# Patient Record
Sex: MALE | Race: BLACK OR AFRICAN AMERICAN | Employment: UNEMPLOYED | URBAN - METROPOLITAN AREA
[De-identification: names, ages, dates, MRNs, and addresses within clinical notes are randomized per-mention and may not be internally consistent; named-entity substitution may affect disease eponyms.]

---

## 2018-10-20 ENCOUNTER — ANESTHESIA (OUTPATIENT)
Dept: OPERATING ROOM | Age: 63
DRG: 004 | End: 2018-10-20
Payer: MEDICARE

## 2018-10-20 ENCOUNTER — APPOINTMENT (OUTPATIENT)
Dept: GENERAL RADIOLOGY | Age: 63
DRG: 004 | End: 2018-10-20
Payer: MEDICARE

## 2018-10-20 ENCOUNTER — HOSPITAL ENCOUNTER (INPATIENT)
Age: 63
LOS: 5 days | Discharge: HOME HEALTH | DRG: 004 | End: 2018-10-25
Attending: EMERGENCY MEDICINE | Admitting: INTERNAL MEDICINE
Payer: MEDICARE

## 2018-10-20 ENCOUNTER — ANESTHESIA EVENT (OUTPATIENT)
Dept: OPERATING ROOM | Age: 63
DRG: 004 | End: 2018-10-20
Payer: MEDICARE

## 2018-10-20 VITALS — SYSTOLIC BLOOD PRESSURE: 109 MMHG | DIASTOLIC BLOOD PRESSURE: 68 MMHG | OXYGEN SATURATION: 100 %

## 2018-10-20 DIAGNOSIS — J96.01 ACUTE RESPIRATORY FAILURE WITH HYPOXIA (HCC): ICD-10-CM

## 2018-10-20 DIAGNOSIS — T78.3XXA ANGIOEDEMA, INITIAL ENCOUNTER: Primary | ICD-10-CM

## 2018-10-20 PROBLEM — T46.4X5A ACE INHIBITOR-AGGRAVATED ANGIOEDEMA, INITIAL ENCOUNTER: Status: ACTIVE | Noted: 2018-10-20

## 2018-10-20 LAB
ABSOLUTE EOS #: <0.03 K/UL (ref 0–0.44)
ABSOLUTE IMMATURE GRANULOCYTE: 0.12 K/UL (ref 0–0.3)
ABSOLUTE LYMPH #: 0.7 K/UL (ref 1.1–3.7)
ABSOLUTE MONO #: 0.43 K/UL (ref 0.1–1.2)
ALLEN TEST: ABNORMAL
ALLEN TEST: POSITIVE
ANION GAP SERPL CALCULATED.3IONS-SCNC: 14 MMOL/L (ref 9–17)
BASOPHILS # BLD: 0 % (ref 0–2)
BASOPHILS ABSOLUTE: 0.03 K/UL (ref 0–0.2)
BUN BLDV-MCNC: 23 MG/DL (ref 8–23)
BUN/CREAT BLD: ABNORMAL (ref 9–20)
CALCIUM SERPL-MCNC: 8.7 MG/DL (ref 8.6–10.4)
CHLORIDE BLD-SCNC: 102 MMOL/L (ref 98–107)
CO2: 20 MMOL/L (ref 20–31)
CREAT SERPL-MCNC: 1.7 MG/DL (ref 0.7–1.2)
DIFFERENTIAL TYPE: ABNORMAL
EOSINOPHILS RELATIVE PERCENT: 0 % (ref 1–4)
FIO2: 55
FIO2: ABNORMAL
GFR AFRICAN AMERICAN: 50 ML/MIN
GFR NON-AFRICAN AMERICAN: 41 ML/MIN
GFR SERPL CREATININE-BSD FRML MDRD: ABNORMAL ML/MIN/{1.73_M2}
GFR SERPL CREATININE-BSD FRML MDRD: ABNORMAL ML/MIN/{1.73_M2}
GLUCOSE BLD-MCNC: 155 MG/DL (ref 70–99)
HCO3 VENOUS: 24.2 MMOL/L (ref 22–29)
HCT VFR BLD CALC: 41.5 % (ref 40.7–50.3)
HEMOGLOBIN: 13.4 G/DL (ref 13–17)
IMMATURE GRANULOCYTES: 1 %
LYMPHOCYTES # BLD: 5 % (ref 24–43)
MCH RBC QN AUTO: 29.2 PG (ref 25.2–33.5)
MCHC RBC AUTO-ENTMCNC: 32.3 G/DL (ref 28.4–34.8)
MCV RBC AUTO: 90.4 FL (ref 82.6–102.9)
MODE: ABNORMAL
MODE: ABNORMAL
MONOCYTES # BLD: 3 % (ref 3–12)
NEGATIVE BASE EXCESS, ART: 1 (ref 0–2)
NEGATIVE BASE EXCESS, VEN: 7 (ref 0–2)
NRBC AUTOMATED: 0 PER 100 WBC
O2 DEVICE/FLOW/%: ABNORMAL
O2 DEVICE/FLOW/%: ABNORMAL
O2 SAT, VEN: 84 % (ref 60–85)
PATIENT TEMP: ABNORMAL
PATIENT TEMP: ABNORMAL
PCO2, VEN: 76.3 MM HG (ref 41–51)
PDW BLD-RTO: 14 % (ref 11.8–14.4)
PH VENOUS: 7.11 (ref 7.32–7.43)
PLATELET # BLD: 306 K/UL (ref 138–453)
PLATELET ESTIMATE: ABNORMAL
PMV BLD AUTO: 9.9 FL (ref 8.1–13.5)
PO2, VEN: 67 MM HG (ref 30–50)
POC HCO3: 24.5 MMOL/L (ref 21–28)
POC O2 SATURATION: 99 % (ref 94–98)
POC PCO2 TEMP: ABNORMAL MM HG
POC PCO2 TEMP: ABNORMAL MM HG
POC PCO2: 43.7 MM HG (ref 35–48)
POC PH TEMP: ABNORMAL
POC PH TEMP: ABNORMAL
POC PH: 7.36 (ref 7.35–7.45)
POC PO2 TEMP: ABNORMAL MM HG
POC PO2 TEMP: ABNORMAL MM HG
POC PO2: 137.5 MM HG (ref 83–108)
POSITIVE BASE EXCESS, ART: ABNORMAL (ref 0–3)
POSITIVE BASE EXCESS, VEN: ABNORMAL (ref 0–3)
POTASSIUM SERPL-SCNC: 5 MMOL/L (ref 3.7–5.3)
RBC # BLD: 4.59 M/UL (ref 4.21–5.77)
RBC # BLD: ABNORMAL 10*6/UL
SAMPLE SITE: ABNORMAL
SAMPLE SITE: ABNORMAL
SEG NEUTROPHILS: 91 % (ref 36–65)
SEGMENTED NEUTROPHILS ABSOLUTE COUNT: 13.06 K/UL (ref 1.5–8.1)
SODIUM BLD-SCNC: 136 MMOL/L (ref 135–144)
TCO2 (CALC), ART: 26 MMOL/L (ref 22–29)
TOTAL CO2, VENOUS: 27 MMOL/L (ref 23–30)
WBC # BLD: 14.4 K/UL (ref 3.5–11.3)
WBC # BLD: ABNORMAL 10*3/UL

## 2018-10-20 PROCEDURE — 2580000003 HC RX 258: Performed by: SURGERY

## 2018-10-20 PROCEDURE — 82803 BLOOD GASES ANY COMBINATION: CPT

## 2018-10-20 PROCEDURE — 6360000002 HC RX W HCPCS: Performed by: NURSE PRACTITIONER

## 2018-10-20 PROCEDURE — 2500000003 HC RX 250 WO HCPCS: Performed by: NURSE ANESTHETIST, CERTIFIED REGISTERED

## 2018-10-20 PROCEDURE — 5A1945Z RESPIRATORY VENTILATION, 24-96 CONSECUTIVE HOURS: ICD-10-PCS | Performed by: FAMILY MEDICINE

## 2018-10-20 PROCEDURE — 2500000003 HC RX 250 WO HCPCS

## 2018-10-20 PROCEDURE — 2000000000 HC ICU R&B

## 2018-10-20 PROCEDURE — 6360000002 HC RX W HCPCS: Performed by: STUDENT IN AN ORGANIZED HEALTH CARE EDUCATION/TRAINING PROGRAM

## 2018-10-20 PROCEDURE — 2500000003 HC RX 250 WO HCPCS: Performed by: NURSE PRACTITIONER

## 2018-10-20 PROCEDURE — 87086 URINE CULTURE/COLONY COUNT: CPT

## 2018-10-20 PROCEDURE — 96372 THER/PROPH/DIAG INJ SC/IM: CPT

## 2018-10-20 PROCEDURE — 6360000002 HC RX W HCPCS

## 2018-10-20 PROCEDURE — 71045 X-RAY EXAM CHEST 1 VIEW: CPT

## 2018-10-20 PROCEDURE — 36600 WITHDRAWAL OF ARTERIAL BLOOD: CPT

## 2018-10-20 PROCEDURE — 94762 N-INVAS EAR/PLS OXIMTRY CONT: CPT

## 2018-10-20 PROCEDURE — 2709999900 HC NON-CHARGEABLE SUPPLY: Performed by: SURGERY

## 2018-10-20 PROCEDURE — 2500000003 HC RX 250 WO HCPCS: Performed by: STUDENT IN AN ORGANIZED HEALTH CARE EDUCATION/TRAINING PROGRAM

## 2018-10-20 PROCEDURE — 99285 EMERGENCY DEPT VISIT HI MDM: CPT

## 2018-10-20 PROCEDURE — 0B110F4 BYPASS TRACHEA TO CUTANEOUS WITH TRACHEOSTOMY DEVICE, OPEN APPROACH: ICD-10-PCS | Performed by: SURGERY

## 2018-10-20 PROCEDURE — 94002 VENT MGMT INPAT INIT DAY: CPT

## 2018-10-20 PROCEDURE — 3700000001 HC ADD 15 MINUTES (ANESTHESIA): Performed by: SURGERY

## 2018-10-20 PROCEDURE — S0028 INJECTION, FAMOTIDINE, 20 MG: HCPCS

## 2018-10-20 PROCEDURE — 6370000000 HC RX 637 (ALT 250 FOR IP): Performed by: SURGERY

## 2018-10-20 PROCEDURE — 3700000000 HC ANESTHESIA ATTENDED CARE: Performed by: SURGERY

## 2018-10-20 PROCEDURE — 31600 PLANNED TRACHEOSTOMY: CPT | Performed by: SURGERY

## 2018-10-20 PROCEDURE — 6360000002 HC RX W HCPCS: Performed by: EMERGENCY MEDICINE

## 2018-10-20 PROCEDURE — 6360000002 HC RX W HCPCS: Performed by: NURSE ANESTHETIST, CERTIFIED REGISTERED

## 2018-10-20 PROCEDURE — 94770 HC ETCO2 MONITOR DAILY: CPT

## 2018-10-20 PROCEDURE — 2580000003 HC RX 258: Performed by: NURSE ANESTHETIST, CERTIFIED REGISTERED

## 2018-10-20 PROCEDURE — 6360000002 HC RX W HCPCS: Performed by: INTERNAL MEDICINE

## 2018-10-20 PROCEDURE — 96375 TX/PRO/DX INJ NEW DRUG ADDON: CPT

## 2018-10-20 PROCEDURE — 85025 COMPLETE CBC W/AUTO DIFF WBC: CPT

## 2018-10-20 PROCEDURE — 96374 THER/PROPH/DIAG INJ IV PUSH: CPT

## 2018-10-20 PROCEDURE — 80048 BASIC METABOLIC PNL TOTAL CA: CPT

## 2018-10-20 RX ORDER — PROPOFOL 10 MG/ML
INJECTION, EMULSION INTRAVENOUS
Status: DISCONTINUED
Start: 2018-10-20 | End: 2018-10-21

## 2018-10-20 RX ORDER — METOPROLOL TARTRATE 5 MG/5ML
5 INJECTION INTRAVENOUS EVERY 4 HOURS PRN
Status: DISCONTINUED | OUTPATIENT
Start: 2018-10-20 | End: 2018-10-25 | Stop reason: HOSPADM

## 2018-10-20 RX ORDER — DIPHENHYDRAMINE HYDROCHLORIDE 50 MG/ML
50 INJECTION INTRAMUSCULAR; INTRAVENOUS ONCE
Status: COMPLETED | OUTPATIENT
Start: 2018-10-20 | End: 2018-10-20

## 2018-10-20 RX ORDER — SODIUM CHLORIDE, SODIUM LACTATE, POTASSIUM CHLORIDE, CALCIUM CHLORIDE 600; 310; 30; 20 MG/100ML; MG/100ML; MG/100ML; MG/100ML
INJECTION, SOLUTION INTRAVENOUS CONTINUOUS PRN
Status: DISCONTINUED | OUTPATIENT
Start: 2018-10-20 | End: 2018-10-20 | Stop reason: SDUPTHER

## 2018-10-20 RX ORDER — MIDAZOLAM HYDROCHLORIDE 1 MG/ML
INJECTION INTRAMUSCULAR; INTRAVENOUS PRN
Status: DISCONTINUED | OUTPATIENT
Start: 2018-10-20 | End: 2018-10-20 | Stop reason: SDUPTHER

## 2018-10-20 RX ORDER — KETAMINE HYDROCHLORIDE 50 MG/ML
2 INJECTION, SOLUTION, CONCENTRATE INTRAMUSCULAR; INTRAVENOUS ONCE
Status: COMPLETED | OUTPATIENT
Start: 2018-10-20 | End: 2018-10-20

## 2018-10-20 RX ORDER — CEFAZOLIN SODIUM 1 G/3ML
INJECTION, POWDER, FOR SOLUTION INTRAMUSCULAR; INTRAVENOUS PRN
Status: DISCONTINUED | OUTPATIENT
Start: 2018-10-20 | End: 2018-10-20 | Stop reason: SDUPTHER

## 2018-10-20 RX ORDER — WOUND DRESSING ADHESIVE - LIQUID
LIQUID MISCELLANEOUS PRN
Status: DISCONTINUED | OUTPATIENT
Start: 2018-10-20 | End: 2018-10-20 | Stop reason: HOSPADM

## 2018-10-20 RX ORDER — SODIUM CHLORIDE, SODIUM LACTATE, POTASSIUM CHLORIDE, CALCIUM CHLORIDE 600; 310; 30; 20 MG/100ML; MG/100ML; MG/100ML; MG/100ML
INJECTION, SOLUTION INTRAVENOUS CONTINUOUS
Status: DISCONTINUED | OUTPATIENT
Start: 2018-10-20 | End: 2018-10-24

## 2018-10-20 RX ORDER — METHYLPREDNISOLONE SODIUM SUCCINATE 125 MG/2ML
125 INJECTION, POWDER, LYOPHILIZED, FOR SOLUTION INTRAMUSCULAR; INTRAVENOUS ONCE
Status: COMPLETED | OUTPATIENT
Start: 2018-10-20 | End: 2018-10-20

## 2018-10-20 RX ORDER — HYDRALAZINE HYDROCHLORIDE 20 MG/ML
10 INJECTION INTRAMUSCULAR; INTRAVENOUS EVERY 6 HOURS PRN
Status: DISCONTINUED | OUTPATIENT
Start: 2018-10-20 | End: 2018-10-21

## 2018-10-20 RX ORDER — FENTANYL CITRATE 50 UG/ML
INJECTION, SOLUTION INTRAMUSCULAR; INTRAVENOUS PRN
Status: DISCONTINUED | OUTPATIENT
Start: 2018-10-20 | End: 2018-10-20 | Stop reason: SDUPTHER

## 2018-10-20 RX ORDER — MIDAZOLAM HYDROCHLORIDE 5 MG/ML
INJECTION INTRAMUSCULAR; INTRAVENOUS
Status: DISCONTINUED
Start: 2018-10-20 | End: 2018-10-21

## 2018-10-20 RX ORDER — POTASSIUM CHLORIDE 7.45 MG/ML
10 INJECTION INTRAVENOUS PRN
Status: DISCONTINUED | OUTPATIENT
Start: 2018-10-20 | End: 2018-10-25 | Stop reason: HOSPADM

## 2018-10-20 RX ORDER — MAGNESIUM SULFATE 1 G/100ML
1 INJECTION INTRAVENOUS PRN
Status: DISCONTINUED | OUTPATIENT
Start: 2018-10-20 | End: 2018-10-25 | Stop reason: HOSPADM

## 2018-10-20 RX ORDER — PROPOFOL 10 MG/ML
10 INJECTION, EMULSION INTRAVENOUS
Status: DISCONTINUED | OUTPATIENT
Start: 2018-10-20 | End: 2018-10-22

## 2018-10-20 RX ORDER — MAGNESIUM HYDROXIDE 1200 MG/15ML
LIQUID ORAL CONTINUOUS PRN
Status: COMPLETED | OUTPATIENT
Start: 2018-10-20 | End: 2018-10-20

## 2018-10-20 RX ORDER — ROCURONIUM BROMIDE 10 MG/ML
INJECTION, SOLUTION INTRAVENOUS PRN
Status: DISCONTINUED | OUTPATIENT
Start: 2018-10-20 | End: 2018-10-20 | Stop reason: SDUPTHER

## 2018-10-20 RX ADMIN — PROPOFOL 70 MCG/KG/MIN: 10 INJECTION, EMULSION INTRAVENOUS at 22:06

## 2018-10-20 RX ADMIN — PHENYLEPHRINE HYDROCHLORIDE 100 MCG: 10 INJECTION INTRAVENOUS at 14:29

## 2018-10-20 RX ADMIN — FAMOTIDINE 20 MG: 10 INJECTION INTRAVENOUS at 12:35

## 2018-10-20 RX ADMIN — METOPROLOL TARTRATE 5 MG: 5 INJECTION INTRAVENOUS at 19:37

## 2018-10-20 RX ADMIN — ROCURONIUM BROMIDE 20 MG: 10 INJECTION INTRAVENOUS at 15:15

## 2018-10-20 RX ADMIN — DIPHENHYDRAMINE HYDROCHLORIDE 50 MG: 50 INJECTION, SOLUTION INTRAMUSCULAR; INTRAVENOUS at 12:35

## 2018-10-20 RX ADMIN — PROPOFOL 71.2 MCG/KG/MIN: 10 INJECTION, EMULSION INTRAVENOUS at 16:58

## 2018-10-20 RX ADMIN — KETAMINE HYDROCHLORIDE 190 MG: 50 INJECTION, SOLUTION INTRAMUSCULAR; INTRAVENOUS at 13:08

## 2018-10-20 RX ADMIN — FENTANYL CITRATE 50 MCG: 50 INJECTION INTRAMUSCULAR; INTRAVENOUS at 14:34

## 2018-10-20 RX ADMIN — EPINEPHRINE 0.3 MG: 1 INJECTION INTRAMUSCULAR; INTRAVENOUS; SUBCUTANEOUS at 12:35

## 2018-10-20 RX ADMIN — FENTANYL CITRATE 100 MCG: 50 INJECTION INTRAMUSCULAR; INTRAVENOUS at 15:31

## 2018-10-20 RX ADMIN — SODIUM CHLORIDE, POTASSIUM CHLORIDE, SODIUM LACTATE AND CALCIUM CHLORIDE: 600; 310; 30; 20 INJECTION, SOLUTION INTRAVENOUS at 14:16

## 2018-10-20 RX ADMIN — PROPOFOL 70 MCG/KG/MIN: 10 INJECTION, EMULSION INTRAVENOUS at 19:36

## 2018-10-20 RX ADMIN — METOPROLOL TARTRATE 5 MG: 5 INJECTION INTRAVENOUS at 23:37

## 2018-10-20 RX ADMIN — HYDRALAZINE HYDROCHLORIDE 10 MG: 20 INJECTION INTRAMUSCULAR; INTRAVENOUS at 18:58

## 2018-10-20 RX ADMIN — ROCURONIUM BROMIDE 30 MG: 10 INJECTION INTRAVENOUS at 13:57

## 2018-10-20 RX ADMIN — ROCURONIUM BROMIDE 30 MG: 10 INJECTION INTRAVENOUS at 15:37

## 2018-10-20 RX ADMIN — HYDROMORPHONE HYDROCHLORIDE 0.5 MG: 1 INJECTION, SOLUTION INTRAMUSCULAR; INTRAVENOUS; SUBCUTANEOUS at 19:36

## 2018-10-20 RX ADMIN — MIDAZOLAM HYDROCHLORIDE 2 MG: 1 INJECTION, SOLUTION INTRAMUSCULAR; INTRAVENOUS at 15:18

## 2018-10-20 RX ADMIN — HYDROMORPHONE HYDROCHLORIDE 0.5 MG: 1 INJECTION, SOLUTION INTRAMUSCULAR; INTRAVENOUS; SUBCUTANEOUS at 23:17

## 2018-10-20 RX ADMIN — FENTANYL CITRATE 50 MCG: 50 INJECTION INTRAMUSCULAR; INTRAVENOUS at 14:21

## 2018-10-20 RX ADMIN — CEFAZOLIN 2000 MG: 1 INJECTION, POWDER, FOR SOLUTION INTRAMUSCULAR; INTRAVENOUS at 14:00

## 2018-10-20 RX ADMIN — ROCURONIUM BROMIDE 20 MG: 10 INJECTION INTRAVENOUS at 14:35

## 2018-10-20 RX ADMIN — SODIUM CHLORIDE, POTASSIUM CHLORIDE, SODIUM LACTATE AND CALCIUM CHLORIDE: 600; 310; 30; 20 INJECTION, SOLUTION INTRAVENOUS at 13:49

## 2018-10-20 RX ADMIN — METHYLPREDNISOLONE SODIUM SUCCINATE 125 MG: 125 INJECTION, POWDER, FOR SOLUTION INTRAMUSCULAR; INTRAVENOUS at 12:36

## 2018-10-20 ASSESSMENT — PULMONARY FUNCTION TESTS
PIF_VALUE: 11
PIF_VALUE: 24
PIF_VALUE: 23
PIF_VALUE: 23
PIF_VALUE: 29
PIF_VALUE: 28
PIF_VALUE: 26
PIF_VALUE: 24
PIF_VALUE: 0
PIF_VALUE: 22
PIF_VALUE: 25
PIF_VALUE: 24
PIF_VALUE: 22
PIF_VALUE: 29
PIF_VALUE: 26
PIF_VALUE: 26
PIF_VALUE: 0
PIF_VALUE: 29
PIF_VALUE: 24
PIF_VALUE: 24
PIF_VALUE: 21
PIF_VALUE: 21
PIF_VALUE: 28
PIF_VALUE: 27
PIF_VALUE: 30
PIF_VALUE: 26
PIF_VALUE: 21
PIF_VALUE: 24
PIF_VALUE: 21
PIF_VALUE: 0
PIF_VALUE: 24
PIF_VALUE: 22
PIF_VALUE: 34
PIF_VALUE: 18
PIF_VALUE: 14
PIF_VALUE: 32
PIF_VALUE: 23
PIF_VALUE: 25
PIF_VALUE: 28
PIF_VALUE: 25
PIF_VALUE: 25
PIF_VALUE: 24
PIF_VALUE: 27
PIF_VALUE: 24
PIF_VALUE: 24
PIF_VALUE: 21
PIF_VALUE: 25
PIF_VALUE: 23
PIF_VALUE: 14
PIF_VALUE: 25
PIF_VALUE: 29
PIF_VALUE: 25
PIF_VALUE: 27
PIF_VALUE: 33
PIF_VALUE: 21
PIF_VALUE: 25
PIF_VALUE: 25
PIF_VALUE: 24
PIF_VALUE: 25
PIF_VALUE: 2
PIF_VALUE: 22
PIF_VALUE: 26
PIF_VALUE: 22
PIF_VALUE: 26
PIF_VALUE: 26
PIF_VALUE: 31
PIF_VALUE: 22
PIF_VALUE: 29
PIF_VALUE: 24
PIF_VALUE: 28
PIF_VALUE: 23
PIF_VALUE: 24
PIF_VALUE: 24
PIF_VALUE: 31
PIF_VALUE: 14
PIF_VALUE: 28
PIF_VALUE: 30
PIF_VALUE: 22
PIF_VALUE: 23
PIF_VALUE: 10
PIF_VALUE: 22
PIF_VALUE: 21
PIF_VALUE: 24
PIF_VALUE: 24
PIF_VALUE: 32
PIF_VALUE: 24
PIF_VALUE: 28
PIF_VALUE: 24
PIF_VALUE: 25
PIF_VALUE: 29
PIF_VALUE: 26
PIF_VALUE: 29
PIF_VALUE: 22
PIF_VALUE: 28
PIF_VALUE: 28
PIF_VALUE: 26
PIF_VALUE: 23
PIF_VALUE: 24
PIF_VALUE: 12
PIF_VALUE: 22
PIF_VALUE: 29
PIF_VALUE: 24
PIF_VALUE: 28
PIF_VALUE: 27
PIF_VALUE: 22
PIF_VALUE: 25
PIF_VALUE: 26
PIF_VALUE: 28
PIF_VALUE: 28
PIF_VALUE: 0
PIF_VALUE: 28
PIF_VALUE: 23
PIF_VALUE: 29
PIF_VALUE: 32
PIF_VALUE: 26
PIF_VALUE: 25
PIF_VALUE: 28
PIF_VALUE: 23
PIF_VALUE: 28
PIF_VALUE: 26
PIF_VALUE: 24
PIF_VALUE: 24
PIF_VALUE: 29
PIF_VALUE: 24
PIF_VALUE: 24

## 2018-10-20 ASSESSMENT — PAIN SCALES - GENERAL: PAINLEVEL_OUTOF10: 0

## 2018-10-20 NOTE — ED PROVIDER NOTES
here and assumed the airway  position. Anesthesia attempted to intubate several times again without success and recommended a surgical airway. Trauma surgery was at bedside and entered the room with the plan to do a bedside trach. The patient was subsequent events waited with a LMA which maintained oxygen saturations. After LMA placement the patient developed a significant amount of subcutaneous emphysema and the neck and anterior chest.    A cricothyrotomy was performed under the direction of Dr. Carolyn Mooney. A 6.0 ET tube was passed over a gum elastic bougie, into the neck, and sutured in place by Dr. Carolyn Mooney. The patient was then taken to the operating room for a tracheostomy. The patient will be admitted to [address] [place] [address]. Procedures     Intubation Procedure Note    Performed by: Nani Agee MD    Indication: airway compromise and airway protection    Consent: The patient provided verbal consent for this procedure. Time out performed: Immediately prior to the procedure a \"time out\" was called to verify the correct patient, the correct procedure, equipment, support staff and site/side marked as required. Medications Used: ketamine intravenously for medication facilitated intubation followed by RSI with etomidate intravenously,  and succinycholine intravenously    Procedure: The patient was placed in the appropriate position. Intubation was attempted by direct laryngoscopy using a glidescope. The base of the vocal cords were visualized but the patient would not tolerate further visualization or intubation attempts. The patient was then given etomidate and succinylcholine. The glidescope was inserted with some difficulty secondary to the massive tongue edema. The cords could not be visualized and the attempt was aborted to allow BVM ventilation. Anesthesia was present at this time and attempted to intubate 5 times without success.  The decision to perform a surgical airway was made and acute care surgery assumed the lead role in managing the airway. Complications: multiple attempts and inability to secure an airway leading to a surgical airway. Final impression      1. Angioedema, initial encounter    2.  Acute respiratory failure with hypoxia ([facility] [address]         Disposition with plan     Disposition: Admitted to [address] Kam Parrish MD  Emergency Medicine Resident    (Please note that portions of this note were completed with a voice recognition program.  Efforts were made to edit the dictations but occasionally words are mis-transcribed.)           Ethan Moses MD  Resident  10/20/18 Yamilex Mejia MD  Resident  10/21/18 1401       Ethan Moses MD  Resident  10/24/18 [id number]

## 2018-10-20 NOTE — ANESTHESIA PROCEDURE NOTES
Airway  Date/Time: 10/20/2018 1:40 PM  Urgency: emergent    Difficult airway    General Information and Staff    Patient location during procedure: ED  Anesthesiologist: Claudell Jericho  Performed: anesthesiologist     Consent for Airway (if performed for an anesthetic, see related documentation for consents)  Patient identity confirmed: per hospital policy  Consent: The procedure was performed in an emergent situation. Written consent not obtained. Risks and benefits: risks, benefits and alternatives were not discussed    no  Indications and Patient Condition  Indications for airway management: airway protection  Spontaneous Ventilation: absent  Sedation level: deep  Preoxygenated: yes  Mask difficulty assessment: vent by bag mask + OA or adjuvant +/- NMBA    Final Airway Details  Final airway type: surgical airway        Number of attempts at approach: 3 or more  Ventilation between attempts: supraglottic airway    Additional Comments  Called to ED for emergent intubation on patient with severe angiodedema, masked by ED team, took a look with glidescope with initial view of swollen cords unable to pass bougie or ett due to severe tongue swelling, swelling increasing and unable to see vocal cords on 2 and 3rd attempt. Trauma surgery called for emergent surgical airway  4.0 LMa placed and able to ventilate through lma while surgical airway placed.   sats 100% entire time

## 2018-10-21 PROBLEM — J95.89 ACUTE POSTOPERATIVE RESPIRATORY INSUFFICIENCY: Status: ACTIVE | Noted: 2018-10-21

## 2018-10-21 LAB
ALBUMIN SERPL-MCNC: 3.6 G/DL (ref 3.5–5.2)
ALBUMIN/GLOBULIN RATIO: 1.2 (ref 1–2.5)
ALLEN TEST: POSITIVE
ALP BLD-CCNC: 59 U/L (ref 40–129)
ALT SERPL-CCNC: 32 U/L (ref 5–41)
ANION GAP SERPL CALCULATED.3IONS-SCNC: 15 MMOL/L (ref 9–17)
AST SERPL-CCNC: 49 U/L
BILIRUB SERPL-MCNC: 0.26 MG/DL (ref 0.3–1.2)
BUN BLDV-MCNC: 21 MG/DL (ref 8–23)
BUN/CREAT BLD: ABNORMAL (ref 9–20)
CALCIUM SERPL-MCNC: 8.3 MG/DL (ref 8.6–10.4)
CHLORIDE BLD-SCNC: 100 MMOL/L (ref 98–107)
CO2: 22 MMOL/L (ref 20–31)
CREAT SERPL-MCNC: 1.79 MG/DL (ref 0.7–1.2)
FIO2: 40
GFR AFRICAN AMERICAN: 47 ML/MIN
GFR NON-AFRICAN AMERICAN: 39 ML/MIN
GFR SERPL CREATININE-BSD FRML MDRD: ABNORMAL ML/MIN/{1.73_M2}
GFR SERPL CREATININE-BSD FRML MDRD: ABNORMAL ML/MIN/{1.73_M2}
GLUCOSE BLD-MCNC: 110 MG/DL (ref 75–110)
GLUCOSE BLD-MCNC: 131 MG/DL (ref 75–110)
GLUCOSE BLD-MCNC: 138 MG/DL (ref 75–110)
GLUCOSE BLD-MCNC: 163 MG/DL (ref 70–99)
GLUCOSE BLD-MCNC: 176 MG/DL (ref 75–110)
HCT VFR BLD CALC: 39.6 % (ref 40.7–50.3)
HEMOGLOBIN: 12.8 G/DL (ref 13–17)
MCH RBC QN AUTO: 29.7 PG (ref 25.2–33.5)
MCHC RBC AUTO-ENTMCNC: 32.3 G/DL (ref 28.4–34.8)
MCV RBC AUTO: 91.9 FL (ref 82.6–102.9)
MODE: NORMAL
NEGATIVE BASE EXCESS, ART: 1 (ref 0–2)
NRBC AUTOMATED: 0 PER 100 WBC
O2 DEVICE/FLOW/%: NORMAL
PATIENT TEMP: NORMAL
PDW BLD-RTO: 13.9 % (ref 11.8–14.4)
PLATELET # BLD: 252 K/UL (ref 138–453)
PMV BLD AUTO: 9.7 FL (ref 8.1–13.5)
POC HCO3: 23.5 MMOL/L (ref 21–28)
POC O2 SATURATION: 98 % (ref 94–98)
POC PCO2 TEMP: NORMAL MM HG
POC PCO2: 39 MM HG (ref 35–48)
POC PH TEMP: NORMAL
POC PH: 7.39 (ref 7.35–7.45)
POC PO2 TEMP: NORMAL MM HG
POC PO2: 102.5 MM HG (ref 83–108)
POSITIVE BASE EXCESS, ART: NORMAL (ref 0–3)
POTASSIUM SERPL-SCNC: 4.8 MMOL/L (ref 3.7–5.3)
RBC # BLD: 4.31 M/UL (ref 4.21–5.77)
SAMPLE SITE: NORMAL
SODIUM BLD-SCNC: 137 MMOL/L (ref 135–144)
TCO2 (CALC), ART: 25 MMOL/L (ref 22–29)
TOTAL PROTEIN: 6.5 G/DL (ref 6.4–8.3)
WBC # BLD: 11.9 K/UL (ref 3.5–11.3)

## 2018-10-21 PROCEDURE — 94770 HC ETCO2 MONITOR DAILY: CPT

## 2018-10-21 PROCEDURE — 2500000003 HC RX 250 WO HCPCS: Performed by: NURSE PRACTITIONER

## 2018-10-21 PROCEDURE — 85027 COMPLETE CBC AUTOMATED: CPT

## 2018-10-21 PROCEDURE — 6360000002 HC RX W HCPCS: Performed by: FAMILY MEDICINE

## 2018-10-21 PROCEDURE — 82803 BLOOD GASES ANY COMBINATION: CPT

## 2018-10-21 PROCEDURE — 6360000002 HC RX W HCPCS: Performed by: STUDENT IN AN ORGANIZED HEALTH CARE EDUCATION/TRAINING PROGRAM

## 2018-10-21 PROCEDURE — 80053 COMPREHEN METABOLIC PANEL: CPT

## 2018-10-21 PROCEDURE — 2700000000 HC OXYGEN THERAPY PER DAY

## 2018-10-21 PROCEDURE — 94003 VENT MGMT INPAT SUBQ DAY: CPT

## 2018-10-21 PROCEDURE — 2000000000 HC ICU R&B

## 2018-10-21 PROCEDURE — 99223 1ST HOSP IP/OBS HIGH 75: CPT | Performed by: FAMILY MEDICINE

## 2018-10-21 PROCEDURE — 82947 ASSAY GLUCOSE BLOOD QUANT: CPT

## 2018-10-21 PROCEDURE — 36415 COLL VENOUS BLD VENIPUNCTURE: CPT

## 2018-10-21 PROCEDURE — 94762 N-INVAS EAR/PLS OXIMTRY CONT: CPT

## 2018-10-21 PROCEDURE — 6360000002 HC RX W HCPCS: Performed by: INTERNAL MEDICINE

## 2018-10-21 RX ORDER — ENALAPRIL MALEATE 10 MG/1
10 TABLET ORAL DAILY
Status: ON HOLD | COMMUNITY
End: 2018-10-21

## 2018-10-21 RX ORDER — HYDRALAZINE HYDROCHLORIDE 20 MG/ML
20 INJECTION INTRAMUSCULAR; INTRAVENOUS EVERY 6 HOURS PRN
Status: DISCONTINUED | OUTPATIENT
Start: 2018-10-21 | End: 2018-10-25 | Stop reason: HOSPADM

## 2018-10-21 RX ORDER — METHYLPREDNISOLONE SODIUM SUCCINATE 125 MG/2ML
80 INJECTION, POWDER, LYOPHILIZED, FOR SOLUTION INTRAMUSCULAR; INTRAVENOUS EVERY 8 HOURS
Status: DISCONTINUED | OUTPATIENT
Start: 2018-10-21 | End: 2018-10-23

## 2018-10-21 RX ADMIN — PROPOFOL 70 MCG/KG/MIN: 10 INJECTION, EMULSION INTRAVENOUS at 03:20

## 2018-10-21 RX ADMIN — METHYLPREDNISOLONE SODIUM SUCCINATE 80 MG: 125 INJECTION, POWDER, FOR SOLUTION INTRAMUSCULAR; INTRAVENOUS at 11:36

## 2018-10-21 RX ADMIN — METOPROLOL TARTRATE 5 MG: 5 INJECTION INTRAVENOUS at 19:45

## 2018-10-21 RX ADMIN — PROPOFOL 70 MCG/KG/MIN: 10 INJECTION, EMULSION INTRAVENOUS at 00:45

## 2018-10-21 RX ADMIN — PROPOFOL 70 MCG/KG/MIN: 10 INJECTION, EMULSION INTRAVENOUS at 08:52

## 2018-10-21 RX ADMIN — PROPOFOL 70 MCG/KG/MIN: 10 INJECTION, EMULSION INTRAVENOUS at 11:34

## 2018-10-21 RX ADMIN — PROPOFOL 70 MCG/KG/MIN: 10 INJECTION, EMULSION INTRAVENOUS at 05:59

## 2018-10-21 RX ADMIN — METHYLPREDNISOLONE SODIUM SUCCINATE 80 MG: 125 INJECTION, POWDER, FOR SOLUTION INTRAMUSCULAR; INTRAVENOUS at 21:29

## 2018-10-21 RX ADMIN — HYDRALAZINE HYDROCHLORIDE 10 MG: 20 INJECTION INTRAMUSCULAR; INTRAVENOUS at 14:41

## 2018-10-21 RX ADMIN — HYDRALAZINE HYDROCHLORIDE 20 MG: 20 INJECTION INTRAMUSCULAR; INTRAVENOUS at 22:03

## 2018-10-21 ASSESSMENT — PULMONARY FUNCTION TESTS
PIF_VALUE: 18
PIF_VALUE: 16
PIF_VALUE: 12
PIF_VALUE: 16

## 2018-10-21 ASSESSMENT — PAIN SCALES - GENERAL: PAINLEVEL_OUTOF10: 0

## 2018-10-21 NOTE — FLOWSHEET NOTE
Dr. Rosio Faulkner called and will accept care of this patient
Internal medicine paged via perfect rt B/P and to update. Dr. Omi Ashley returns calls and patient is not internal medicinepatient.
Patient has not had any scripts filled at [facility] since May 2018. [facility] has no record of the patient utilizing their pharmacies in over 2 years.
Referral/Consult From: Multi-disciplinary team   Support System Spouse; Family members   Continue Visiting (10/20/18)   Complexity of Encounter High   Length of Encounter 2 hours   Spiritual Assessment Completed Yes   Crisis   Type Stroke Alert   Assessment Unable to respond; Approachable;Tearful; Anxious; Fearful;Helplessness   Intervention Active listening;Explored feelings, thoughts, concerns;Explored coping resources;Sustaining presence/ Ministry of presence   Outcome Grieving; Acceptance; Connection/belonging;Comfort;Expressed gratitude;Expressed feelings/needs/concerns; Less anxious, less agitated   Spiritual/Temple   Type Spiritual support

## 2018-10-21 NOTE — PLAN OF CARE
Problem: Restraint Use - Nonviolent/Non-Self-Destructive Behavior:  Goal: Absence of restraint indications  Absence of restraint indications   Outcome: Ongoing  q1h checks.

## 2018-10-21 NOTE — PLAN OF CARE
Problem: Nutrition  Goal: Optimal nutrition therapy  Outcome: Ongoing  Nutrition Problem: Inadequate oral intake  Intervention: Food and/or Nutrient Delivery: Continue NPO  Nutritional Goals: Meet % of estimated nutrient needs.

## 2018-10-21 NOTE — PROGRESS NOTES
Blood:55]    Drain/tube output:  In: 2800 [I.V.:2800]  Out: 1980 [XZUDY:5940]    LAB:  CBC:   Recent Labs      10/20/18   1835  10/21/18   0430   WBC  14.4*  11.9*   HGB  13.4  12.8*   HCT  41.5  39.6*   MCV  90.4  91.9   PLT  306  252     BMP:   Recent Labs      10/20/18   1835  10/21/18   0430   NA  136  137   K  5.0  4.8   CL  102  100   CO2  20  22   BUN  23  21   CREATININE  1.70*  1.79*   GLUCOSE  155*  163*     COAGS:   Recent Labs      10/21/18   0430   PROT  6.5       RADIOLOGY:  CXR 10/20/18    Impression   New trach tube and small left effusion           Adrianna Kerr, DO  10/21/18, 8:33 AM     Trauma Attending Attestation      I have reviewed the above TECSS note(s) and confirmed the key elements of the medical history and physical exam. I have seen and examined the pt. I have discussed the findings, established the care plan and recommendations with Resident, GCS RN, bedside nurse. I personally reviewed any images in real time to expedite the patient's care.         Winsome Zamorano MD  10/27/2018  10:29 PM

## 2018-10-21 NOTE — H&P
[facility] [place]      HISTORY AND PHYSICAL EXAMINATION            Date:   10/21/2018  Patient name:  Oh Cooper  Date of admission:  10/20/2018 12:23 PM  MRN:   [id number]  Account:  [de-identified]  YOB: 1955  PCP:    Zac Rosales MD  Room:   [address] [place] [address]  Code Status:    Full Code    Chief Complaint:     Chief Complaint   Patient presents with    Oral Swelling       History Obtained From:     electronic medical record - patient intubated . Patients spouse. History of Present Illness: The patient is a 59 y.o. Non-/non  male who presents with Oral Swelling   and he is admitted to the hospital for the management of  ACE inhibitor-aggravated angioedema, initial encounter. Patient drove to ER after started having swelling of tongue and upper airway . He started having swelling around noon time. Patient was home alone at home when symptoms started as he had come from work as a  . Upon arrival to ER patient had respiratory distress and was unable to speak. Patient was given epinephrine , solumedrol and benadryl  and underwent cricothyroidotomy after failed intubation . Patient was later taken to OR for emergent tracheotomy . Patient has H/O essential hypertension, GERD , hyperlipidemia . Patient was on Vasotec . Past Medical History:     Past Medical History:   Diagnosis Date    Atrial premature beats     Chronic back pain     Depression     GERD (gastroesophageal reflux disease)     GERD (gastroesophageal reflux disease)     Hyperlipidemia     Hypertension     Osteoarthritis     Substance abuse ([facility] [address]     past        Past Surgical History:     Past Surgical History:   Procedure Laterality Date    FOREARM SURGERY      HERNIA REPAIR      KNEE SURGERY      LIPOMA RESECTION          Medications Prior to Admission:     Prior to Admission medications    Medication Sig Start Date End Date Taking?  Authorizing Provider   enalapril Excess, Art NOT REPORTED 0.0 - 3.0    POC O2 SAT 98 94.0 - 98.0 %    O2 Device/Flow/% Adult Ventilator     Chintan Test POSITIVE     Sample Site Right Radial Artery     Mode NOT REPORTED     FIO2 40.0     Pt Temp NOT REPORTED     POC pH Temp NOT REPORTED     POC pCO2 Temp NOT REPORTED mm Hg    POC pO2 Temp NOT REPORTED mm Hg   COMPREHENSIVE METABOLIC PANEL    Collection Time: 10/21/18  4:30 AM   Result Value Ref Range    Glucose 163 (H) 70 - 99 mg/dL    BUN 21 8 - 23 mg/dL    CREATININE 1.79 (H) 0.70 - 1.20 mg/dL    Bun/Cre Ratio NOT REPORTED 9 - 20    Calcium 8.3 (L) 8.6 - 10.4 mg/dL    Sodium 137 135 - 144 mmol/L    Potassium 4.8 3.7 - 5.3 mmol/L    Chloride 100 98 - 107 mmol/L    CO2 22 20 - 31 mmol/L    Anion Gap 15 9 - 17 mmol/L    Alkaline Phosphatase 59 40 - 129 U/L    ALT 32 5 - 41 U/L    AST 49 (H) <40 U/L    Total Bilirubin 0.26 (L) 0.3 - 1.2 mg/dL    Total Protein 6.5 6.4 - 8.3 g/dL    Alb 3.6 3.5 - 5.2 g/dL    Albumin/Globulin Ratio 1.2 1.0 - 2.5    GFR Non- 39 (L) >60 mL/min    GFR  47 (L) >60 mL/min    GFR Comment          GFR Staging NOT REPORTED    CBC    Collection Time: 10/21/18  4:30 AM   Result Value Ref Range    WBC 11.9 (H) 3.5 - 11.3 k/uL    RBC 4.31 4.21 - 5.77 m/uL    Hemoglobin 12.8 (L) 13.0 - 17.0 g/dL    Hematocrit 39.6 (L) 40.7 - 50.3 %    MCV 91.9 82.6 - 102.9 fL    MCH 29.7 25.2 - 33.5 pg    MCHC 32.3 28.4 - 34.8 g/dL    RDW 13.9 11.8 - 14.4 %    Platelets 947 681 - 567 k/uL    MPV 9.7 8.1 - 13.5 fL    NRBC Automated 0.0 0.0 per 100 WBC   POC Glucose Fingerstick    Collection Time: 10/21/18  7:04 AM   Result Value Ref Range    POC Glucose 138 (H) 75 - 110 mg/dL   POC Glucose Fingerstick    Collection Time: 10/21/18 11:41 AM   Result Value Ref Range    POC Glucose 110 75 - 110 mg/dL       Imaging/Diagonstics:        Assessment :      Primary Problem  ACE inhibitor-aggravated angioedema, initial encounter    Active Hospital Problems    Diagnosis Date Noted  Acute postoperative respiratory insufficiency [J95.89] 10/21/2018    ACE inhibitor-aggravated angioedema, initial encounter [T78. 3XXA, T46.4X5A] 10/20/2018    Acute respiratory failure with hypoxia ([facility] [address] [J96.01] 10/20/2018       Plan:     Patient status Admit as inpatient in the  Cardiovascular ICU    1. Angioedema secondary to Vasotec - medication discontinued . Solumedrol 80 mg Q 8 hrs . 2. Acute resp failure from upper airway swelling and obstruction due to angioedema - Vent management per CC . Trach care . 3. Essential hypertension - hydralazine as needed for now . 4. Hyperlipidemia     Discussed with Dr Dorman Falling surgeon . Consultations:   IP CONSULT TO CRITICAL CARE  IP CONSULT TO INTERNAL MEDICINE  IP CONSULT TO PULMONOLOGY    Patient is admitted as inpatient status because of co-morbidities listed above, severity of signs and symptoms as outlined, requirement for current medical therapies and most importantly because of direct risk to patient if care not provided in a hospital setting. Wife at bed side, updated .      Adam Hawkins MD  10/21/2018    Copy sent to Dr. Sandra Berman MD    (Please note that portions of this note were completed with a voice recognition program. Efforts were made to edit the dictations but occasionally words are mis-transcribed.)

## 2018-10-22 LAB
CULTURE: NO GROWTH
GLUCOSE BLD-MCNC: 139 MG/DL (ref 75–110)
GLUCOSE BLD-MCNC: 153 MG/DL (ref 75–110)
HCT VFR BLD CALC: 37.8 % (ref 40.7–50.3)
HEMOGLOBIN: 12.3 G/DL (ref 13–17)
Lab: NORMAL
MCH RBC QN AUTO: 29.1 PG (ref 25.2–33.5)
MCHC RBC AUTO-ENTMCNC: 32.5 G/DL (ref 28.4–34.8)
MCV RBC AUTO: 89.4 FL (ref 82.6–102.9)
NRBC AUTOMATED: 0 PER 100 WBC
PDW BLD-RTO: 14.3 % (ref 11.8–14.4)
PLATELET # BLD: 265 K/UL (ref 138–453)
PMV BLD AUTO: 10 FL (ref 8.1–13.5)
RBC # BLD: 4.23 M/UL (ref 4.21–5.77)
SPECIMEN DESCRIPTION: NORMAL
STATUS: NORMAL
WBC # BLD: 12.6 K/UL (ref 3.5–11.3)

## 2018-10-22 PROCEDURE — 82947 ASSAY GLUCOSE BLOOD QUANT: CPT

## 2018-10-22 PROCEDURE — 6360000002 HC RX W HCPCS: Performed by: FAMILY MEDICINE

## 2018-10-22 PROCEDURE — 6370000000 HC RX 637 (ALT 250 FOR IP): Performed by: FAMILY MEDICINE

## 2018-10-22 PROCEDURE — 97166 OT EVAL MOD COMPLEX 45 MIN: CPT

## 2018-10-22 PROCEDURE — 2500000003 HC RX 250 WO HCPCS: Performed by: NURSE PRACTITIONER

## 2018-10-22 PROCEDURE — 99232 SBSQ HOSP IP/OBS MODERATE 35: CPT | Performed by: INTERNAL MEDICINE

## 2018-10-22 PROCEDURE — 99232 SBSQ HOSP IP/OBS MODERATE 35: CPT | Performed by: FAMILY MEDICINE

## 2018-10-22 PROCEDURE — 36415 COLL VENOUS BLD VENIPUNCTURE: CPT

## 2018-10-22 PROCEDURE — G8979 MOBILITY GOAL STATUS: HCPCS

## 2018-10-22 PROCEDURE — 85027 COMPLETE CBC AUTOMATED: CPT

## 2018-10-22 PROCEDURE — 2580000003 HC RX 258: Performed by: STUDENT IN AN ORGANIZED HEALTH CARE EDUCATION/TRAINING PROGRAM

## 2018-10-22 PROCEDURE — 94762 N-INVAS EAR/PLS OXIMTRY CONT: CPT

## 2018-10-22 PROCEDURE — G8988 SELF CARE GOAL STATUS: HCPCS

## 2018-10-22 PROCEDURE — 97530 THERAPEUTIC ACTIVITIES: CPT

## 2018-10-22 PROCEDURE — 2000000000 HC ICU R&B

## 2018-10-22 PROCEDURE — 2700000000 HC OXYGEN THERAPY PER DAY

## 2018-10-22 PROCEDURE — G8978 MOBILITY CURRENT STATUS: HCPCS

## 2018-10-22 PROCEDURE — G8987 SELF CARE CURRENT STATUS: HCPCS

## 2018-10-22 PROCEDURE — 97162 PT EVAL MOD COMPLEX 30 MIN: CPT

## 2018-10-22 RX ORDER — AMLODIPINE BESYLATE 10 MG/1
10 TABLET ORAL DAILY
Status: DISCONTINUED | OUTPATIENT
Start: 2018-10-22 | End: 2018-10-25 | Stop reason: HOSPADM

## 2018-10-22 RX ORDER — ALBUTEROL SULFATE 2.5 MG/3ML
2.5 SOLUTION RESPIRATORY (INHALATION) EVERY 4 HOURS PRN
Status: DISCONTINUED | OUTPATIENT
Start: 2018-10-22 | End: 2018-10-25 | Stop reason: HOSPADM

## 2018-10-22 RX ORDER — PANTOPRAZOLE SODIUM 40 MG/1
40 TABLET, DELAYED RELEASE ORAL
Status: DISCONTINUED | OUTPATIENT
Start: 2018-10-23 | End: 2018-10-25 | Stop reason: HOSPADM

## 2018-10-22 RX ORDER — AMLODIPINE BESYLATE 5 MG/1
5 TABLET ORAL DAILY
Status: DISCONTINUED | OUTPATIENT
Start: 2018-10-22 | End: 2018-10-22

## 2018-10-22 RX ORDER — CALCIUM CARBONATE 200(500)MG
500 TABLET,CHEWABLE ORAL 3 TIMES DAILY PRN
Status: DISCONTINUED | OUTPATIENT
Start: 2018-10-22 | End: 2018-10-25 | Stop reason: HOSPADM

## 2018-10-22 RX ADMIN — AMLODIPINE BESYLATE 10 MG: 10 TABLET ORAL at 12:53

## 2018-10-22 RX ADMIN — METHYLPREDNISOLONE SODIUM SUCCINATE 80 MG: 125 INJECTION, POWDER, FOR SOLUTION INTRAMUSCULAR; INTRAVENOUS at 20:58

## 2018-10-22 RX ADMIN — ANTACID TABLETS 500 MG: 500 TABLET, CHEWABLE ORAL at 12:53

## 2018-10-22 RX ADMIN — METHYLPREDNISOLONE SODIUM SUCCINATE 80 MG: 125 INJECTION, POWDER, FOR SOLUTION INTRAMUSCULAR; INTRAVENOUS at 11:05

## 2018-10-22 RX ADMIN — METOPROLOL TARTRATE 5 MG: 5 INJECTION INTRAVENOUS at 11:05

## 2018-10-22 RX ADMIN — METOPROLOL TARTRATE 5 MG: 5 INJECTION INTRAVENOUS at 00:05

## 2018-10-22 RX ADMIN — METHYLPREDNISOLONE SODIUM SUCCINATE 80 MG: 125 INJECTION, POWDER, FOR SOLUTION INTRAMUSCULAR; INTRAVENOUS at 03:23

## 2018-10-22 RX ADMIN — METOPROLOL TARTRATE 5 MG: 5 INJECTION INTRAVENOUS at 23:04

## 2018-10-22 RX ADMIN — SODIUM CHLORIDE, POTASSIUM CHLORIDE, SODIUM LACTATE AND CALCIUM CHLORIDE: 600; 310; 30; 20 INJECTION, SOLUTION INTRAVENOUS at 07:36

## 2018-10-22 RX ADMIN — METOPROLOL TARTRATE 5 MG: 5 INJECTION INTRAVENOUS at 05:05

## 2018-10-22 RX ADMIN — PANTOPRAZOLE SODIUM 40 MG: 40 TABLET, DELAYED RELEASE ORAL at 12:53

## 2018-10-22 ASSESSMENT — ENCOUNTER SYMPTOMS
NAUSEA: 0
SORE THROAT: 0
ABDOMINAL PAIN: 0
CONSTIPATION: 0
VOMITING: 0
VOICE CHANGE: 0
DIARRHEA: 0
BLOOD IN STOOL: 0
SHORTNESS OF BREATH: 0
COUGH: 0
SINUS PRESSURE: 0
WHEEZING: 0

## 2018-10-22 ASSESSMENT — PAIN SCALES - GENERAL: PAINLEVEL_OUTOF10: 0

## 2018-10-22 NOTE — PROGRESS NOTES
Assessment   Performance deficits / Impairments: Decreased functional mobility ; Decreased ADL status; Decreased balance;Decreased endurance;Decreased high-level IADLs  Prognosis: Good  Decision Making: Medium Complexity  Patient Education: OT Services/OT POC  REQUIRES OT FOLLOW UP: Yes  Activity Tolerance  Activity Tolerance: Patient Tolerated treatment well  Safety Devices  Safety Devices in place: Yes  Type of devices: Call light within reach;Nurse notified; Left in chair  Restraints  Initially in place: No         Plan   Plan  Times per week: 4-5x/wk    G-Code  OT G-codes  Functional Assessment Tool Used: [facility]  Score: 18/24  Functional Limitation: Self care  Self Care Current Status (): At least 40 percent but less than 60 percent impaired, limited or restricted  Self Care Goal Status (): At least 20 percent but less than 40 percent impaired, limited or restricted    Goals  Short term goals  Time Frame for Short term goals: Pt will, by discharge:  Short term goal 1: perform functional transfers/functional mobility with mod IND using least restrictive AD  Short term goal 2: independently demo safety awareness during ADLs and functional transfers/functional mobility  Short term goal 3: independently demo EC/WC techniques as needed during ADLs and functional transfers/functional mobility  Short term goal 4: perform ADL tasks with SBA and setup/AE/DME PRN       Therapy Time   Individual Concurrent Group Co-treatment   Time In 1534         Time Out 1547         Minutes 13            Discharge recommendations discussed with patient during initial evaluation.     THELMA Del Cid/RONNIE

## 2018-10-22 NOTE — PROGRESS NOTES
TRICHOMONAS, YEAST, BACTERIA, CLARITYU, SPECGRAV, LEUKOCYTESUR, UROBILINOGEN, BILIRUBINUR, BLOODU, GLUCOSEU, AMORPHOUS in the last 72 hours. Invalid input(s): KETONESU  No results for input(s): PHART, [id number], PO2ART in the last 72 hours. ABG   No results found for: PH, PCO2, PO2, HCO3, O2SAT  Lab Results   Component Value Date    MODE NOT REPORTED 10/21/2018             Assessment:   Principal Problem:    ACE inhibitor-aggravated angioedema, initial encounter  Active Problems:    Acute respiratory failure with hypoxia (HCC)    Acute postoperative respiratory insufficiency  Resolved Problems:    * No resolved hospital problems.  *         Plan:  Continue trach care       Electronically signed by Félix Morse MD on 10/22/2018 at 1:23 PM

## 2018-10-22 NOTE — PLAN OF CARE
Problem: Respiratory  Intervention: Respiratory assessment  PROVIDE ADEQUATE OXYGENATION WITH ACCEPTABLE SP02/ABG'S    [x]  IDENTIFY APPROPRIATE OXYGEN THERAPY  [x]   MONITOR SP02/ABG'S AS NEEDED   [x]   PATIENT EDUCATION AS NEEDED    Trach care BID and PRN

## 2018-10-22 NOTE — PROGRESS NOTES
Swallow eval completed at bedside. Patient able to swallow water without s/s of aspiration. Dr Alli Aiken at bedside, started on clear liquid diet.

## 2018-10-23 LAB
ANION GAP SERPL CALCULATED.3IONS-SCNC: 15 MMOL/L (ref 9–17)
BUN BLDV-MCNC: 23 MG/DL (ref 8–23)
BUN/CREAT BLD: ABNORMAL (ref 9–20)
CALCIUM SERPL-MCNC: 9 MG/DL (ref 8.6–10.4)
CHLORIDE BLD-SCNC: 105 MMOL/L (ref 98–107)
CO2: 23 MMOL/L (ref 20–31)
CREAT SERPL-MCNC: 1.07 MG/DL (ref 0.7–1.2)
GFR AFRICAN AMERICAN: >60 ML/MIN
GFR NON-AFRICAN AMERICAN: >60 ML/MIN
GFR SERPL CREATININE-BSD FRML MDRD: ABNORMAL ML/MIN/{1.73_M2}
GFR SERPL CREATININE-BSD FRML MDRD: ABNORMAL ML/MIN/{1.73_M2}
GLUCOSE BLD-MCNC: 169 MG/DL (ref 70–99)
HCT VFR BLD CALC: 38.1 % (ref 40.7–50.3)
HEMOGLOBIN: 12.4 G/DL (ref 13–17)
MCH RBC QN AUTO: 29.7 PG (ref 25.2–33.5)
MCHC RBC AUTO-ENTMCNC: 32.5 G/DL (ref 28.4–34.8)
MCV RBC AUTO: 91.1 FL (ref 82.6–102.9)
NRBC AUTOMATED: 0 PER 100 WBC
PDW BLD-RTO: 14 % (ref 11.8–14.4)
PLATELET # BLD: 279 K/UL (ref 138–453)
PMV BLD AUTO: 9.8 FL (ref 8.1–13.5)
POTASSIUM SERPL-SCNC: 4.3 MMOL/L (ref 3.7–5.3)
RBC # BLD: 4.18 M/UL (ref 4.21–5.77)
SODIUM BLD-SCNC: 143 MMOL/L (ref 135–144)
WBC # BLD: 13.2 K/UL (ref 3.5–11.3)

## 2018-10-23 PROCEDURE — 85027 COMPLETE CBC AUTOMATED: CPT

## 2018-10-23 PROCEDURE — 1200000000 HC SEMI PRIVATE

## 2018-10-23 PROCEDURE — 6370000000 HC RX 637 (ALT 250 FOR IP): Performed by: FAMILY MEDICINE

## 2018-10-23 PROCEDURE — 6360000002 HC RX W HCPCS: Performed by: FAMILY MEDICINE

## 2018-10-23 PROCEDURE — 97535 SELF CARE MNGMENT TRAINING: CPT

## 2018-10-23 PROCEDURE — 94762 N-INVAS EAR/PLS OXIMTRY CONT: CPT

## 2018-10-23 PROCEDURE — 99232 SBSQ HOSP IP/OBS MODERATE 35: CPT | Performed by: FAMILY MEDICINE

## 2018-10-23 PROCEDURE — 80048 BASIC METABOLIC PNL TOTAL CA: CPT

## 2018-10-23 PROCEDURE — 2700000000 HC OXYGEN THERAPY PER DAY

## 2018-10-23 PROCEDURE — 99232 SBSQ HOSP IP/OBS MODERATE 35: CPT | Performed by: INTERNAL MEDICINE

## 2018-10-23 PROCEDURE — 97110 THERAPEUTIC EXERCISES: CPT

## 2018-10-23 PROCEDURE — 97116 GAIT TRAINING THERAPY: CPT

## 2018-10-23 PROCEDURE — 2500000003 HC RX 250 WO HCPCS: Performed by: NURSE PRACTITIONER

## 2018-10-23 PROCEDURE — 36415 COLL VENOUS BLD VENIPUNCTURE: CPT

## 2018-10-23 RX ORDER — PREDNISONE 20 MG/1
40 TABLET ORAL DAILY
Status: DISCONTINUED | OUTPATIENT
Start: 2018-10-23 | End: 2018-10-25 | Stop reason: HOSPADM

## 2018-10-23 RX ADMIN — PANTOPRAZOLE SODIUM 40 MG: 40 TABLET, DELAYED RELEASE ORAL at 08:26

## 2018-10-23 RX ADMIN — AMLODIPINE BESYLATE 10 MG: 10 TABLET ORAL at 08:26

## 2018-10-23 RX ADMIN — HYDRALAZINE HYDROCHLORIDE 20 MG: 20 INJECTION INTRAMUSCULAR; INTRAVENOUS at 03:20

## 2018-10-23 RX ADMIN — METOPROLOL TARTRATE 5 MG: 5 INJECTION INTRAVENOUS at 19:48

## 2018-10-23 RX ADMIN — PREDNISONE 40 MG: 20 TABLET ORAL at 11:40

## 2018-10-23 RX ADMIN — HYDRALAZINE HYDROCHLORIDE 20 MG: 20 INJECTION INTRAMUSCULAR; INTRAVENOUS at 16:04

## 2018-10-23 RX ADMIN — METHYLPREDNISOLONE SODIUM SUCCINATE 80 MG: 125 INJECTION, POWDER, FOR SOLUTION INTRAMUSCULAR; INTRAVENOUS at 04:31

## 2018-10-23 ASSESSMENT — ENCOUNTER SYMPTOMS
WHEEZING: 0
COUGH: 0
VOICE CHANGE: 0
CONSTIPATION: 0
SORE THROAT: 0
DIARRHEA: 0
ABDOMINAL PAIN: 0
BLOOD IN STOOL: 0
VOMITING: 0
SINUS PRESSURE: 0
NAUSEA: 0
SHORTNESS OF BREATH: 0

## 2018-10-23 NOTE — PROGRESS NOTES
Physical Therapy  Facility/Department: [facility]  Daily Treatment Note  NAME: Oh Cooper  : 1955  MRN: [id number]    Date of Service: 10/23/2018    Discharge Recommendations:  Home with assist PRN   PT Equipment Recommendations  Equipment Needed: No    Patient Diagnosis(es): The primary encounter diagnosis was Angioedema, initial encounter. A diagnosis of Acute respiratory failure with hypoxia (HCC) was also pertinent to this visit. has a past medical history of Atrial premature beats; Chronic back pain; Depression; GERD (gastroesophageal reflux disease); GERD (gastroesophageal reflux disease); Hyperlipidemia; Hypertension; Osteoarthritis; and Substance abuse ([facility] [address] has a past surgical history that includes hernia repair; knee surgery; lipoma resection; Forearm surgery; and pr tracheostomy,emerg,xtrach (N/A, 10/20/2018). Restrictions  Restrictions/Precautions  Restrictions/Precautions: Fall Risk, Up as Tolerated  Required Braces or Orthoses?: No  Position Activity Restriction  Other position/activity restrictions: Up with assistance. Trach collar at 35%. Subjective   General  Response To Previous Treatment: Patient with no complaints from previous session. Family / Caregiver Present: No  Subjective  Subjective: Pt eager to participate in PT. NO c/o.  Pt with trach mask prior to arrivel, doffed for amb,   Pain Screening  Patient Currently in Pain: Denies  Vital Signs  Patient Currently in Pain: Denies       Orientation  Orientation  Overall Orientation Status: Within Normal Limits  Objective   Bed mobility  Comment: Sitting up upon arrival  Transfers  Sit to Stand: Stand by assistance  Stand to sit: Stand by assistance  Ambulation  Ambulation?: Yes  Ambulation 1  Surface: level tile  Device: No Device  Assistance: Stand by assistance  Quality of Gait: Decreased brittany, lateral sway, wide YANE, but no LOB  Distance: 100 ft  Comments: O2 93% after amb on room air  Stairs/Curb  Stairs?: No

## 2018-10-23 NOTE — PROGRESS NOTES
Pulmonary Progress Note    CC:  Angioedema needing emergent trach     Subjective:  Comfortable   Tolerating oral diet    Ros unable to perform due to trach     Immunization     There is no immunization history on file for this patient. Pneumococcal Vaccine     [] Up to date    [x] Indicated   [] Refused  [] Contraindicated       Influenza Vaccine   [] Up to date    [x] Indicated   [] Refused  [] Contraindicated     PAST MEDICAL HISTORY:       Diagnosis Date    Atrial premature beats     Chronic back pain     Depression     GERD (gastroesophageal reflux disease)     GERD (gastroesophageal reflux disease)     Hyperlipidemia     Hypertension     Osteoarthritis     Substance abuse ([facility] [address]     past         Family History:   Family History   Problem Relation Age of Onset    Diabetes Sister     High Blood Pressure Sister     Heart Disease Sister     Stroke Sister     Cancer Brother     Diabetes Brother     High Blood Pressure Brother        SURGICAL HISTORY:   Past Surgical History:   Procedure Laterality Date    FOREARM SURGERY      HERNIA REPAIR      KNEE SURGERY      LIPOMA RESECTION      [place] TRACHEOSTOMY,EMERG,XTRACH N/A 10/20/2018    TRACHEOTOMY FROM EMERGENT [address] IN ED performed by Ronny Sommers MD at [address]:   reports that he quit smoking about 18 years ago. He uses smokeless tobacco.  ETOH:   reports that he does not drink alcohol. ALLERGIES:    Allergies   Allergen Reactions    Ace Inhibitors Swelling     Severe angioedema required emergent tracheostomy       Home Meds:   Prior to Admission medications    Medication Sig Start Date End Date Taking? Authorizing Provider   hydrochlorothiazide (HYDRODIURIL) 25 MG tablet Take 1 tablet by mouth daily. 7/21/14   Jose Armando Kelley MD   simvastatin (ZOCOR) 20 MG tablet Take 1 tablet by mouth nightly. 7/21/14   Jose Armando Kelley MD   meloxicam (MOBIC) 7.5 MG tablet Take 1 tablet by mouth daily.  7/21/14   Kristopher MATHUR

## 2018-10-23 NOTE — PROGRESS NOTES
[facility] [place]      Daily Progress Note     Admit Date: 10/20/2018  Bed/Room No.  1027/1027-01  Admitting Physician : Juliano Schmitt MD  Code Status :[address] Day:  LOS: 3 days   Chief Complaint:     Chief Complaint   Patient presents with    Oral Swelling     Principal Problem:    ACE inhibitor-aggravated angioedema, initial encounter  Active Problems:    Acute respiratory failure with hypoxia ([address]    Acute postoperative respiratory insufficiency    Angio-edema  Resolved Problems:    * No resolved hospital problems. *    Subjective : Interval History/Significant events :  10/23/18    Patient is doing OK , able to take clears . Denies any dysphagia . Remains afebrile . BP better . Vitals - Stable afebrile. Paulene Search HTN uncontrolled. Labs - stable , leucocytosis . Nursing notes , Consults notes reviewed. Overnight events and updates discussed with Nursing staff . Background History:         Evan Prather is 59 y.o. male  Who was admitted to the hospital on 10/20/2018 for treatment of ACE inhibitor-aggravated angioedema, initial encounter. Patient drove himself to ER after started having swelling of tongue and upper airway . He started having swelling around noon time. Patient was home alone at home when symptoms started as he had come from work as a  . Upon arrival to ER patient had respiratory distress and was unable to speak. Patient was given epinephrine , solumedrol and benadryl  and underwent cricothyroidotomy after failed intubation . Patient was later taken to OR for emergent tracheotomy . Patient has H/O essential hypertension, GERD , hyperlipidemia . Patient was on Vasotec . Medication was stopped . Patient was started on solumedrol . Patient is retired hydralazine for blood pressure control per intubated. He was switched to amlodipine 10 mg daily and blood pressure remained stable. Patient was extubated on 10/21/18.      PMH:  Past Medical findings:    Recent Labs      10/21/18   0430  10/22/18   0454  10/23/18   0451   WBC  11.9*  12.6*  13.2*   HGB  12.8*  12.3*  12.4*   HCT  39.6*  37.8*  38.1*   PLT  252  265  279     Recent Labs      10/20/18   1835  10/21/18   0430  10/23/18   0451   NA  136  137  143   K  5.0  4.8  4.3   CL  102  100  105   CO2  20 22 23   GLUCOSE  155*  163*  169*   BUN  23 21 23   CREATININE  1.70*  1.79*  1.07   CALCIUM  8.7  8.3*  9.0     Recent Labs      10/21/18   0430   PROT  6.5   LABALBU  3.6   AST  49*   ALT  32   ALKPHOS  59   BILITOT  0.26*          Specific Gravity, UA   Date Value Ref Range Status   07/13/2014 1.025  Final     Spec Grav, UA   Date Value Ref Range Status   07/13/2014 1.025       Protein, UA   Date Value Ref Range Status   07/13/2014 TRACE (A) NEG Final     Protein, UA POC   Date Value Ref Range Status   07/13/2014 trace       RBC, UA   Date Value Ref Range Status   12/11/2012 None 0 - 2 /HPF Final     Blood, UA POC   Date Value Ref Range Status   07/13/2014 negative       Bacteria, UA   Date Value Ref Range Status   12/11/2012 NOT REPORTED NONE Final     Nitrite, Urine   Date Value Ref Range Status   07/13/2014 NEGATIVE NEG Final     WBC, UA   Date Value Ref Range Status   12/11/2012 None 0 - 5 /HPF Final     Leukocyte Esterase, Urine   Date Value Ref Range Status   07/13/2014 NEGATIVE NEG Final     Leukocytes, UA   Date Value Ref Range Status   07/13/2014 negative     CXR 10/20/18   Impression:        New trach tube and small left effusion             Clinical Course : gradually improving     Assessment and Plan  :        1. Angioedema secondary to Vasotec - medication discontinued . Continue Solumedrol 80 mg Q 8 hrs . 2. Acute resp failure from upper airway swelling and obstruction due to angioedema - off Vent . Albuterol as needed . 3. Essential hypertension - hydralazine as needed for now . Start amlodipine . 4. Hyperlipidemia    Advance diet to full liquid. Transfer to [facility].

## 2018-10-23 NOTE — PLAN OF CARE
Round with Pulmonary Attending regarding tracheostomy. Request to deflate trach cuff now  Plan to de cannulate tomorrow if tolerates before discharge.

## 2018-10-23 NOTE — PROGRESS NOTES
10/23/18 7415   Surgical Airway (trach)   Placement Date/Time: 10/20/18 (c) 1340     Status Secured   Site Assessment Clean;Dry   Site Care Cleansed;Dried;Dressing applied   Inner Cannula Care Changed/new   Ties Assessment Changed;Clean;Dry; Intact; Secure     Trach care done, pt naeem well

## 2018-10-23 NOTE — PLAN OF CARE
Problem: Respiratory  Intervention: Administer treatments as ordered  BRONCHOSPASM/BRONCHOCONSTRICTION     [x]         IMPROVE AERATION/BREATH SOUNDS  [x]   ADMINISTER BRONCHODILATOR THERAPY AS APPROPRIATE  [x]   ASSESS BREATH SOUNDS  []   IMPLEMENT AEROSOL/MDI PROTOCOL  [x]   PATIENT EDUCATION AS NEEDED

## 2018-10-23 NOTE — PLAN OF CARE
Problem: Respiratory  Intervention: Respiratory assessment  Trach care BID and PRN    PRN FOR BRONCHOSPASM/BRONCHOCONSTRICTION     [x]         IMPROVE AERATION/BREATH SOUNDS  [x]   ADMINISTER BRONCHODILATOR THERAPY AS APPROPRIATE  [x]   ASSESS BREATH SOUNDS  []   IMPLEMENT AEROSOL/MDI PROTOCOL  [x]   PATIENT EDUCATION AS NEEDED    PROVIDE ADEQUATE OXYGENATION WITH ACCEPTABLE SP02/ABG'S    [x]  IDENTIFY APPROPRIATE OXYGEN THERAPY  [x]   MONITOR SP02/ABG'S AS NEEDED   [x]   PATIENT EDUCATION AS NEEDED

## 2018-10-24 LAB
ANION GAP SERPL CALCULATED.3IONS-SCNC: 10 MMOL/L (ref 9–17)
BUN BLDV-MCNC: 23 MG/DL (ref 8–23)
BUN/CREAT BLD: ABNORMAL (ref 9–20)
CALCIUM SERPL-MCNC: 8.9 MG/DL (ref 8.6–10.4)
CHLORIDE BLD-SCNC: 103 MMOL/L (ref 98–107)
CO2: 24 MMOL/L (ref 20–31)
CREAT SERPL-MCNC: 1.15 MG/DL (ref 0.7–1.2)
GFR AFRICAN AMERICAN: >60 ML/MIN
GFR NON-AFRICAN AMERICAN: >60 ML/MIN
GFR SERPL CREATININE-BSD FRML MDRD: ABNORMAL ML/MIN/{1.73_M2}
GFR SERPL CREATININE-BSD FRML MDRD: ABNORMAL ML/MIN/{1.73_M2}
GLUCOSE BLD-MCNC: 106 MG/DL (ref 70–99)
HCT VFR BLD CALC: 39.1 % (ref 40.7–50.3)
HEMOGLOBIN: 12.5 G/DL (ref 13–17)
MCH RBC QN AUTO: 29.1 PG (ref 25.2–33.5)
MCHC RBC AUTO-ENTMCNC: 32 G/DL (ref 28.4–34.8)
MCV RBC AUTO: 91.1 FL (ref 82.6–102.9)
NRBC AUTOMATED: 0 PER 100 WBC
PDW BLD-RTO: 14.2 % (ref 11.8–14.4)
PLATELET # BLD: 333 K/UL (ref 138–453)
PMV BLD AUTO: 10.4 FL (ref 8.1–13.5)
POTASSIUM SERPL-SCNC: 4.2 MMOL/L (ref 3.7–5.3)
RBC # BLD: 4.29 M/UL (ref 4.21–5.77)
SODIUM BLD-SCNC: 137 MMOL/L (ref 135–144)
WBC # BLD: 12.2 K/UL (ref 3.5–11.3)

## 2018-10-24 PROCEDURE — 99232 SBSQ HOSP IP/OBS MODERATE 35: CPT | Performed by: FAMILY MEDICINE

## 2018-10-24 PROCEDURE — 94762 N-INVAS EAR/PLS OXIMTRY CONT: CPT

## 2018-10-24 PROCEDURE — 2580000003 HC RX 258: Performed by: FAMILY MEDICINE

## 2018-10-24 PROCEDURE — 36415 COLL VENOUS BLD VENIPUNCTURE: CPT

## 2018-10-24 PROCEDURE — 2700000000 HC OXYGEN THERAPY PER DAY

## 2018-10-24 PROCEDURE — 6370000000 HC RX 637 (ALT 250 FOR IP): Performed by: FAMILY MEDICINE

## 2018-10-24 PROCEDURE — 1200000000 HC SEMI PRIVATE

## 2018-10-24 PROCEDURE — 80048 BASIC METABOLIC PNL TOTAL CA: CPT

## 2018-10-24 PROCEDURE — 99233 SBSQ HOSP IP/OBS HIGH 50: CPT | Performed by: INTERNAL MEDICINE

## 2018-10-24 PROCEDURE — 85027 COMPLETE CBC AUTOMATED: CPT

## 2018-10-24 RX ORDER — SODIUM CHLORIDE 9 MG/ML
INJECTION, SOLUTION INTRAVENOUS CONTINUOUS
Status: DISCONTINUED | OUTPATIENT
Start: 2018-10-24 | End: 2018-10-25 | Stop reason: HOSPADM

## 2018-10-24 RX ADMIN — SODIUM CHLORIDE: 9 INJECTION, SOLUTION INTRAVENOUS at 09:52

## 2018-10-24 RX ADMIN — PREDNISONE 40 MG: 20 TABLET ORAL at 09:51

## 2018-10-24 RX ADMIN — PANTOPRAZOLE SODIUM 40 MG: 40 TABLET, DELAYED RELEASE ORAL at 09:51

## 2018-10-24 RX ADMIN — AMLODIPINE BESYLATE 10 MG: 10 TABLET ORAL at 09:51

## 2018-10-24 RX ADMIN — METOPROLOL TARTRATE 25 MG: 25 TABLET ORAL at 20:48

## 2018-10-24 RX ADMIN — METOPROLOL TARTRATE 25 MG: 25 TABLET ORAL at 09:51

## 2018-10-24 ASSESSMENT — ENCOUNTER SYMPTOMS
SHORTNESS OF BREATH: 0
ABDOMINAL PAIN: 0
COUGH: 0
BLOOD IN STOOL: 0
DIARRHEA: 0
SORE THROAT: 0
WHEEZING: 0
CONSTIPATION: 0
VOMITING: 0
VOICE CHANGE: 0
NAUSEA: 0
SINUS PRESSURE: 0

## 2018-10-24 NOTE — DISCHARGE INSTR - COC
Continuity of Care Form    Patient Name: Zia Salomon   :  1955  MRN:  [id number]    Admit date:  10/20/2018  Discharge date:  10/25/2018    Code Status Order: Full Code   Advance Directives:   Advance Care Flowsheet Documentation     Date/Time Healthcare Directive Type of Healthcare Directive Copy in [address] Agent's Name Healthcare Agent's Phone Number    10/22/18 4943  No, patient does not have an advance directive for healthcare treatment -- -- -- -- --          Admitting Physician:  Vu Kellogg MD  PCP: Elise Sherwood MD    Discharging Nurse: [id number] [facility] Unit/Room#:   Discharging Unit Phone Number: [phone]    Emergency Contact:   Extended Emergency Contact Information  Primary Emergency Contact: [facility]  Address: [address] Phone: [phone]  Relation: Spouse    Past Surgical History:  Past Surgical History:   Procedure Laterality Date    FOREARM SURGERY      HERNIA REPAIR      KNEE SURGERY      LIPOMA RESECTION      [place] [facility] N/A 10/20/2018    TRACHEOTOMY FROM [facility] IN ED performed by Maykel Dodd MD at [place] [address]       Immunization History: There is no immunization history on file for this patient. Active Problems:  Patient Active Problem List   Diagnosis Code    Hypertension I10    Hyperlipidemia E78.5    GERD (gastroesophageal reflux disease) K21.9    Depression F32.9    Osteoarthritis M19.90    ACE inhibitor-aggravated angioedema, initial encounter T78. 3XXA, [id number]    Acute respiratory failure with hypoxia (HCC) J96.01    Acute postoperative respiratory insufficiency J95.89    Angio-edema T78. 3XXA       Isolation/Infection:   Isolation          No Isolation            Nurse Assessment:  Last Vital Signs: BP (!) 158/88   Pulse 100   Temp 100 °F (37.8 °C) (Oral)   Resp 24   Ht 5' 9\"

## 2018-10-24 NOTE — PROGRESS NOTES
Patient BP: 174/80, HR: 106.  5mg lopressor IV given, pt resting comfortable, NAD at this time. Will continue to monitor.

## 2018-10-25 VITALS
BODY MASS INDEX: 29.22 KG/M2 | RESPIRATION RATE: 24 BRPM | OXYGEN SATURATION: 96 % | TEMPERATURE: 99 F | HEIGHT: 69 IN | SYSTOLIC BLOOD PRESSURE: 171 MMHG | HEART RATE: 88 BPM | WEIGHT: 197.3 LBS | DIASTOLIC BLOOD PRESSURE: 89 MMHG

## 2018-10-25 PROBLEM — J95.89 ACUTE POSTOPERATIVE RESPIRATORY INSUFFICIENCY: Status: RESOLVED | Noted: 2018-10-21 | Resolved: 2018-10-25

## 2018-10-25 PROBLEM — T46.4X5A ACE INHIBITOR-AGGRAVATED ANGIOEDEMA, INITIAL ENCOUNTER: Status: RESOLVED | Noted: 2018-10-20 | Resolved: 2018-10-25

## 2018-10-25 PROBLEM — T78.3XXA ACE INHIBITOR-AGGRAVATED ANGIOEDEMA, INITIAL ENCOUNTER: Status: RESOLVED | Noted: 2018-10-20 | Resolved: 2018-10-25

## 2018-10-25 PROBLEM — J96.01 ACUTE RESPIRATORY FAILURE WITH HYPOXIA (HCC): Status: RESOLVED | Noted: 2018-10-20 | Resolved: 2018-10-25

## 2018-10-25 PROBLEM — Z93.0 TRACHEOSTOMY STATUS (HCC): Status: ACTIVE | Noted: 2018-10-25

## 2018-10-25 LAB
ANION GAP SERPL CALCULATED.3IONS-SCNC: 13 MMOL/L (ref 9–17)
BUN BLDV-MCNC: 23 MG/DL (ref 8–23)
BUN/CREAT BLD: ABNORMAL (ref 9–20)
CALCIUM SERPL-MCNC: 9 MG/DL (ref 8.6–10.4)
CHLORIDE BLD-SCNC: 102 MMOL/L (ref 98–107)
CO2: 26 MMOL/L (ref 20–31)
CREAT SERPL-MCNC: 1.27 MG/DL (ref 0.7–1.2)
GFR AFRICAN AMERICAN: >60 ML/MIN
GFR NON-AFRICAN AMERICAN: 57 ML/MIN
GFR SERPL CREATININE-BSD FRML MDRD: ABNORMAL ML/MIN/{1.73_M2}
GFR SERPL CREATININE-BSD FRML MDRD: ABNORMAL ML/MIN/{1.73_M2}
GLUCOSE BLD-MCNC: 109 MG/DL (ref 70–99)
HCT VFR BLD CALC: 38.2 % (ref 40.7–50.3)
HEMOGLOBIN: 12.3 G/DL (ref 13–17)
MCH RBC QN AUTO: 29.3 PG (ref 25.2–33.5)
MCHC RBC AUTO-ENTMCNC: 32.2 G/DL (ref 28.4–34.8)
MCV RBC AUTO: 91 FL (ref 82.6–102.9)
NRBC AUTOMATED: 0 PER 100 WBC
PDW BLD-RTO: 13.4 % (ref 11.8–14.4)
PLATELET # BLD: 299 K/UL (ref 138–453)
PMV BLD AUTO: 9.9 FL (ref 8.1–13.5)
POTASSIUM SERPL-SCNC: 4.3 MMOL/L (ref 3.7–5.3)
RBC # BLD: 4.2 M/UL (ref 4.21–5.77)
SODIUM BLD-SCNC: 141 MMOL/L (ref 135–144)
WBC # BLD: 10.3 K/UL (ref 3.5–11.3)

## 2018-10-25 PROCEDURE — 97116 GAIT TRAINING THERAPY: CPT

## 2018-10-25 PROCEDURE — 99233 SBSQ HOSP IP/OBS HIGH 50: CPT | Performed by: INTERNAL MEDICINE

## 2018-10-25 PROCEDURE — 36415 COLL VENOUS BLD VENIPUNCTURE: CPT

## 2018-10-25 PROCEDURE — 99239 HOSP IP/OBS DSCHRG MGMT >30: CPT | Performed by: FAMILY MEDICINE

## 2018-10-25 PROCEDURE — 97530 THERAPEUTIC ACTIVITIES: CPT

## 2018-10-25 PROCEDURE — 6360000002 HC RX W HCPCS: Performed by: FAMILY MEDICINE

## 2018-10-25 PROCEDURE — 6370000000 HC RX 637 (ALT 250 FOR IP): Performed by: FAMILY MEDICINE

## 2018-10-25 PROCEDURE — 85027 COMPLETE CBC AUTOMATED: CPT

## 2018-10-25 PROCEDURE — 80048 BASIC METABOLIC PNL TOTAL CA: CPT

## 2018-10-25 RX ORDER — PREDNISONE 20 MG/1
40 TABLET ORAL DAILY
Qty: 6 TABLET | Refills: 0 | Status: SHIPPED | OUTPATIENT
Start: 2018-10-26 | End: 2018-10-29

## 2018-10-25 RX ORDER — AMLODIPINE BESYLATE 10 MG/1
10 TABLET ORAL DAILY
Qty: 30 TABLET | Refills: 3 | Status: SHIPPED | OUTPATIENT
Start: 2018-10-26

## 2018-10-25 RX ADMIN — PREDNISONE 40 MG: 20 TABLET ORAL at 10:35

## 2018-10-25 RX ADMIN — HYDRALAZINE HYDROCHLORIDE 20 MG: 20 INJECTION INTRAMUSCULAR; INTRAVENOUS at 01:10

## 2018-10-25 RX ADMIN — PANTOPRAZOLE SODIUM 40 MG: 40 TABLET, DELAYED RELEASE ORAL at 10:35

## 2018-10-25 RX ADMIN — AMLODIPINE BESYLATE 10 MG: 10 TABLET ORAL at 10:35

## 2018-10-25 RX ADMIN — METOPROLOL TARTRATE 25 MG: 25 TABLET ORAL at 10:35

## 2018-10-25 ASSESSMENT — ENCOUNTER SYMPTOMS
CONSTIPATION: 0
VOICE CHANGE: 0
BLOOD IN STOOL: 0
DIARRHEA: 0
ABDOMINAL PAIN: 0
WHEEZING: 0
SINUS PRESSURE: 0
SORE THROAT: 0
COUGH: 0
SHORTNESS OF BREATH: 0
VOMITING: 0
NAUSEA: 0

## 2018-10-25 NOTE — PROGRESS NOTES
balance;Decreased strength  Assessment: Pt has met all goals and is discharged from PT.   Prognosis: Good  REQUIRES PT FOLLOW UP: Yes  Activity Tolerance  Activity Tolerance: Patient Tolerated treatment well     Goals  Short term goals  Time Frame for Short term goals: 10  Short term goal 1: Pt to perfom functional transfers independently  Short term goal 2: Pt to amb 300ft w/o AD independently  Short term goal 3: Pt to tolerate 45 mins of therapy to demo increased endurance  Short term goal 4: Pt to ascend/descend 3 stairs independently  Patient Goals   Patient goals : Unable to verbalize     Plan    Plan  Times per week: 5-6x  Times per day: Daily  Current Treatment Recommendations: Balance Training, Functional Mobility Training, Transfer Training, Neuromuscular Re-education, Home Exercise Program, Safety Education & Training, Patient/Caregiver Education & Training, Gait Training, Stair training  Safety Devices  Type of devices: Gait belt, Patient at risk for falls, Left in chair, Nurse notified, Call light within reach  Restraints  Initially in place: No     Therapy Time   Individual Concurrent Group Co-treatment   Time In  1220         Time Out  1250         Minutes  30                 [place], [place]

## 2018-10-25 NOTE — PROGRESS NOTES
Called to bedside regarding suture removal for tracheostomy. Toula Lombard was removed yesterday per nursing. Pulmonology had questions regarding blue stay sutures that were placed during tracheostomy on 10/20/18. Post operative note on 10/20/18 indicates that black trach piece sutures are okay to remove in 1 week (10/27/18) and blue sutures are okay to remove in 2 weeks (11/3/18). Patient examined at bedside. Patient doing well. Tracheostomy site clean/dry/intact. Blue stay sutures in place. Krystle Tanner D.O.   General Surgery Resident PGY-1  Pager # [phone]  10/25/2018, 11:13 AM

## 2018-10-25 NOTE — PLAN OF CARE
Problem: Falls - Risk of:  Goal: Will remain free from falls  Will remain free from falls   Outcome: Ongoing    Goal: Absence of physical injury  Absence of physical injury   Outcome: Ongoing      Problem: Risk for Impaired Skin Integrity  Goal: Tissue integrity - skin and mucous membranes  Structural intactness and normal physiological function of skin and  mucous membranes.    Outcome: Ongoing      Problem: Nutrition  Goal: Optimal nutrition therapy  Outcome: Ongoing      Problem: ABCDS Injury Assessment  Goal: Absence of physical injury  Outcome: Ongoing

## 2018-10-25 NOTE — PROGRESS NOTES
He was switched to amlodipine 10 mg daily and blood pressure remained stable. Patient was extubated on 10/21/18. Tracheostomy tube was removed on 10/24/18. PMH:  Past Medical History:   Diagnosis Date    Atrial premature beats     Chronic back pain     Depression     GERD (gastroesophageal reflux disease)     GERD (gastroesophageal reflux disease)     Hyperlipidemia     Hypertension     Osteoarthritis     Substance abuse ([facility] [address]     past      Allergies: Allergies   Allergen Reactions    Ace Inhibitors Swelling     Severe angioedema required emergent tracheostomy      Medications :    metoprolol tartrate 25 mg Oral BID   predniSONE 40 mg Oral Daily   pantoprazole 40 mg Oral QAM AC   amLODIPine 10 mg Oral Daily       Review of Systems   Review of Systems   Constitutional: Negative for activity change, appetite change, chills, fatigue, fever and unexpected weight change. HENT: Negative for congestion, mouth sores, postnasal drip, sinus pressure, sore throat and voice change. Eyes: Negative for visual disturbance. Respiratory: Negative for cough, shortness of breath and wheezing. Cardiovascular: Negative for chest pain and palpitations. Gastrointestinal: Negative for abdominal pain, blood in stool, constipation, diarrhea, nausea and vomiting. Endocrine: Negative for polyuria. Genitourinary: Negative for difficulty urinating, dysuria, frequency and urgency. Musculoskeletal: Negative for arthralgias, joint swelling and myalgias. Neurological: Negative for dizziness, tremors, speech difficulty, light-headedness and headaches.      Objective :      Current Vitals : Temp: 99 °F (37.2 °C),  Pulse: 88, Resp: 24, BP: (!) 171/89, SpO2: 96 %  Last 24 Hrs Vitals   Patient Vitals for the past 24 hrs:   BP Temp Temp src Pulse Resp SpO2 Weight   10/25/18 0659 (!) 171/89 99 °F (37.2 °C) Oral 88 24 96 % -   10/25/18 0630 - - - - - - 197 lb 4.8 oz (89.5 kg)   10/25/18 0110 (!) 160/89 - - - - - -

## 2018-10-25 NOTE — DISCHARGE SUMMARY
HYDRODIURIL  Take 1 tablet by mouth daily. omeprazole 20 MG delayed release capsule  Commonly known as:  PRILOSEC  Take 1 capsule by mouth Daily. QUEtiapine 100 MG tablet  Commonly known as:  SEROQUEL     sildenafil 50 MG tablet  Commonly known as:  VIAGRA  Take 1 tablet by mouth as needed for Erectile Dysfunction. simvastatin 20 MG tablet  Commonly known as:  ZOCOR  Take 1 tablet by mouth nightly. STOP taking these medications    enalapril 10 MG tablet  Commonly known as:  VASOTEC     enalapril 5 MG tablet  Commonly known as:  VASOTEC     meloxicam 7.5 MG tablet  Commonly known as:  MOBIC           Where to Get Your Medications      These medications were sent to [facility] (), [facility], [facility] () [address]    Phone:  [phone]   · amLODIPine 10 MG tablet  · metoprolol tartrate 25 MG tablet  · predniSONE 20 MG tablet         Time Spent on discharge is  38 mins in patient examination, evaluation, counseling as well as medication reconciliation, prescriptions for required medications, discharge plan and follow up. Electronically signed by   Wale Feliciano MD  10/25/2018  1:40 PM      Thank you Dr. Cristobal Becerra MD for the opportunity to be involved in this patient's care.

## 2018-11-06 ENCOUNTER — OFFICE VISIT (OUTPATIENT)
Dept: SURGERY | Age: 63
End: 2018-11-06
Payer: COMMERCIAL

## 2018-11-06 VITALS
BODY MASS INDEX: 30.21 KG/M2 | HEART RATE: 97 BPM | HEIGHT: 69 IN | SYSTOLIC BLOOD PRESSURE: 115 MMHG | DIASTOLIC BLOOD PRESSURE: 80 MMHG | WEIGHT: 204 LBS

## 2018-11-06 DIAGNOSIS — Z98.890 HX OF TRACHEOSTOMY: Primary | ICD-10-CM

## 2018-11-06 PROCEDURE — 99024 POSTOP FOLLOW-UP VISIT: CPT | Performed by: SPECIALIST

## 2018-11-06 PROCEDURE — 99202 OFFICE O/P NEW SF 15 MIN: CPT

## 2018-11-06 RX ORDER — EPINEPHRINE 0.3 MG/.3ML
INJECTION SUBCUTANEOUS
Refills: 1 | COMMUNITY
Start: 2018-11-01

## 2019-01-01 ENCOUNTER — HOSPITAL ENCOUNTER (OUTPATIENT)
Dept: PHYSICAL THERAPY | Facility: CLINIC | Age: 64
Setting detail: THERAPIES SERIES
Discharge: HOME | End: 2019-11-12
Payer: COMMERCIAL

## 2019-01-01 ENCOUNTER — HOSPITAL ENCOUNTER (OUTPATIENT)
Dept: PHYSICAL THERAPY | Facility: CLINIC | Age: 64
Setting detail: THERAPIES SERIES
Discharge: HOME | End: 2019-12-17
Payer: COMMERCIAL

## 2020-01-01 ENCOUNTER — APPOINTMENT (OUTPATIENT)
Dept: GENERAL RADIOLOGY | Age: 65
DRG: 025 | End: 2020-01-01

## 2020-01-01 ENCOUNTER — APPOINTMENT (OUTPATIENT)
Dept: CT IMAGING | Age: 65
DRG: 025 | End: 2020-01-01

## 2020-01-01 ENCOUNTER — HOSPITAL ENCOUNTER (INPATIENT)
Age: 65
LOS: 5 days | DRG: 025 | End: 2020-04-21
Attending: EMERGENCY MEDICINE | Admitting: INTERNAL MEDICINE
Payer: MEDICARE

## 2020-01-01 ENCOUNTER — APPOINTMENT (OUTPATIENT)
Dept: DIALYSIS | Age: 65
DRG: 025 | End: 2020-01-01

## 2020-01-01 ENCOUNTER — APPOINTMENT (OUTPATIENT)
Dept: CT IMAGING | Age: 65
End: 2020-01-01

## 2020-01-01 ENCOUNTER — APPOINTMENT (OUTPATIENT)
Dept: ULTRASOUND IMAGING | Age: 65
DRG: 025 | End: 2020-01-01

## 2020-01-01 ENCOUNTER — HOSPITAL ENCOUNTER (EMERGENCY)
Age: 65
Discharge: ANOTHER HOSPITAL | End: 2020-04-16
Attending: EMERGENCY MEDICINE

## 2020-01-01 VITALS
HEART RATE: 86 BPM | RESPIRATION RATE: 18 BRPM | HEIGHT: 71 IN | BODY MASS INDEX: 26.04 KG/M2 | WEIGHT: 186 LBS | SYSTOLIC BLOOD PRESSURE: 140 MMHG | TEMPERATURE: 97.8 F | OXYGEN SATURATION: 93 % | DIASTOLIC BLOOD PRESSURE: 82 MMHG

## 2020-01-01 VITALS
HEIGHT: 69 IN | HEART RATE: 45 BPM | TEMPERATURE: 97.5 F | DIASTOLIC BLOOD PRESSURE: 76 MMHG | RESPIRATION RATE: 11 BRPM | WEIGHT: 176.37 LBS | BODY MASS INDEX: 26.12 KG/M2 | SYSTOLIC BLOOD PRESSURE: 131 MMHG | OXYGEN SATURATION: 100 %

## 2020-01-01 LAB
-: ABNORMAL
-: NORMAL
ABO/RH: NORMAL
ABSOLUTE EOS #: 0 K/UL (ref 0–0.4)
ABSOLUTE EOS #: 0.14 K/UL (ref 0–0.4)
ABSOLUTE EOS #: 0.15 K/UL (ref 0–0.4)
ABSOLUTE IMMATURE GRANULOCYTE: 0 K/UL (ref 0–0.3)
ABSOLUTE IMMATURE GRANULOCYTE: 0.15 K/UL (ref 0–0.3)
ABSOLUTE IMMATURE GRANULOCYTE: 0.21 K/UL (ref 0–0.3)
ABSOLUTE IMMATURE GRANULOCYTE: 0.27 K/UL (ref 0–0.3)
ABSOLUTE IMMATURE GRANULOCYTE: 0.36 K/UL (ref 0–0.3)
ABSOLUTE IMMATURE GRANULOCYTE: 0.87 K/UL (ref 0–0.3)
ABSOLUTE LYMPH #: 0.99 K/UL (ref 1–4.8)
ABSOLUTE LYMPH #: 1.02 K/UL (ref 1–4.8)
ABSOLUTE LYMPH #: 1.03 K/UL (ref 1–4.8)
ABSOLUTE LYMPH #: 1.37 K/UL (ref 1–4.8)
ABSOLUTE LYMPH #: 1.61 K/UL (ref 1–4.8)
ABSOLUTE LYMPH #: 1.99 K/UL (ref 1–4.8)
ABSOLUTE MONO #: 0.54 K/UL (ref 0.1–0.8)
ABSOLUTE MONO #: 0.57 K/UL (ref 0.1–0.8)
ABSOLUTE MONO #: 0.62 K/UL (ref 0.1–0.8)
ABSOLUTE MONO #: 0.73 K/UL (ref 0.2–0.8)
ABSOLUTE MONO #: 1.1 K/UL (ref 0.1–0.8)
ABSOLUTE MONO #: 1.38 K/UL (ref 0.1–0.8)
ACETAMINOPHEN LEVEL: <5 UG/ML (ref 10–30)
ACETONE BLOOD: NORMAL
ACTION: NORMAL
AFP: 2.9 UG/L
ALBUMIN SERPL-MCNC: 2.2 G/DL (ref 3.5–5.2)
ALBUMIN SERPL-MCNC: 2.2 G/DL (ref 3.5–5.2)
ALBUMIN SERPL-MCNC: 2.5 G/DL (ref 3.5–5.2)
ALBUMIN SERPL-MCNC: 2.6 G/DL (ref 3.5–5.2)
ALBUMIN SERPL-MCNC: 2.6 G/DL (ref 3.5–5.2)
ALBUMIN SERPL-MCNC: 2.7 G/DL (ref 3.5–5.2)
ALBUMIN SERPL-MCNC: 2.9 G/DL (ref 3.5–5.2)
ALBUMIN/GLOBULIN RATIO: 0.7 (ref 1–2.5)
ALBUMIN/GLOBULIN RATIO: 0.8 (ref 1–2.5)
ALBUMIN/GLOBULIN RATIO: 0.9 (ref 1–2.5)
ALBUMIN/GLOBULIN RATIO: 0.9 (ref 1–2.5)
ALBUMIN/GLOBULIN RATIO: 1 (ref 1–2.5)
ALBUMIN/GLOBULIN RATIO: 1 (ref 1–2.5)
ALBUMIN/GLOBULIN RATIO: ABNORMAL (ref 1–2.5)
ALLEN TEST: ABNORMAL
ALLEN TEST: POSITIVE
ALLEN TEST: POSITIVE
ALP BLD-CCNC: 306 U/L (ref 40–129)
ALP BLD-CCNC: 328 U/L (ref 40–129)
ALP BLD-CCNC: 412 U/L (ref 40–129)
ALP BLD-CCNC: 496 U/L (ref 40–129)
ALP BLD-CCNC: 506 U/L (ref 40–129)
ALP BLD-CCNC: 516 U/L (ref 40–129)
ALP BLD-CCNC: 570 U/L (ref 40–129)
ALPHA-1 ANTITRYPSIN: 167 MG/DL (ref 90–200)
ALT SERPL-CCNC: 117 U/L (ref 5–41)
ALT SERPL-CCNC: 122 U/L (ref 5–41)
ALT SERPL-CCNC: 125 U/L (ref 5–41)
ALT SERPL-CCNC: 69 U/L (ref 5–41)
ALT SERPL-CCNC: 73 U/L (ref 5–41)
ALT SERPL-CCNC: 90 U/L (ref 5–41)
ALT SERPL-CCNC: 99 U/L (ref 5–41)
AMMONIA: 105 UMOL/L (ref 16–60)
AMMONIA: 392 UMOL/L (ref 16–60)
AMMONIA: 66 UMOL/L (ref 16–60)
AMORPHOUS: ABNORMAL
AMPHETAMINE SCREEN URINE: NEGATIVE
AMYLASE: 59 U/L (ref 28–100)
ANION GAP SERPL CALCULATED.3IONS-SCNC: 23 MMOL/L (ref 9–17)
ANION GAP SERPL CALCULATED.3IONS-SCNC: 25 MMOL/L (ref 9–17)
ANION GAP SERPL CALCULATED.3IONS-SCNC: 25 MMOL/L (ref 9–17)
ANION GAP SERPL CALCULATED.3IONS-SCNC: 26 MMOL/L (ref 9–17)
ANION GAP SERPL CALCULATED.3IONS-SCNC: 27 MMOL/L (ref 9–17)
ANION GAP SERPL CALCULATED.3IONS-SCNC: 29 MMOL/L (ref 9–17)
ANION GAP SERPL CALCULATED.3IONS-SCNC: 30 MMOL/L (ref 9–17)
ANION GAP SERPL CALCULATED.3IONS-SCNC: 35 MMOL/L (ref 9–17)
ANION GAP: 13 MMOL/L (ref 7–16)
ANTI-NUCLEAR ANTIBODY (ANA): NEGATIVE
ANTI-NUCLEAR ANTIBODY (ANA): NEGATIVE
ANTIBODY SCREEN: NEGATIVE
ARM BAND NUMBER: NORMAL
AST SERPL-CCNC: 239 U/L
AST SERPL-CCNC: 268 U/L
AST SERPL-CCNC: 357 U/L
AST SERPL-CCNC: 394 U/L
AST SERPL-CCNC: 464 U/L
AST SERPL-CCNC: 473 U/L
AST SERPL-CCNC: 521 U/L
BACTERIA: ABNORMAL
BARBITURATE SCREEN URINE: NEGATIVE
BASOPHILS # BLD: 0 % (ref 0–2)
BASOPHILS # BLD: 1 %
BASOPHILS ABSOLUTE: 0 K/UL (ref 0–0.2)
BASOPHILS ABSOLUTE: 0.15 K/UL (ref 0–0.2)
BENZODIAZEPINE SCREEN, URINE: NEGATIVE
BETA-HYDROXYBUTYRATE: 2.9 MMOL/L (ref 0.02–0.27)
BILIRUB SERPL-MCNC: 34.01 MG/DL (ref 0.3–1.2)
BILIRUB SERPL-MCNC: 34.74 MG/DL (ref 0.3–1.2)
BILIRUB SERPL-MCNC: 34.96 MG/DL (ref 0.3–1.2)
BILIRUB SERPL-MCNC: 40.3 MG/DL (ref 0.3–1.2)
BILIRUB SERPL-MCNC: 41.4 MG/DL (ref 0.3–1.2)
BILIRUB SERPL-MCNC: 42.73 MG/DL (ref 0.3–1.2)
BILIRUB SERPL-MCNC: 46.44 MG/DL (ref 0.3–1.2)
BILIRUBIN DIRECT: 26.31 MG/DL
BILIRUBIN DIRECT: >27 MG/DL
BILIRUBIN URINE: ABNORMAL
BILIRUBIN, INDIRECT: 7.7 MG/DL (ref 0–1)
BILIRUBIN, INDIRECT: ABNORMAL MG/DL (ref 0–1)
BLD PROD TYP BPU: NORMAL
BUN BLDV-MCNC: 107 MG/DL (ref 8–23)
BUN BLDV-MCNC: 120 MG/DL (ref 8–23)
BUN BLDV-MCNC: 42 MG/DL (ref 8–23)
BUN BLDV-MCNC: 48 MG/DL (ref 8–23)
BUN BLDV-MCNC: 55 MG/DL (ref 8–23)
BUN BLDV-MCNC: 70 MG/DL (ref 8–23)
BUN BLDV-MCNC: 93 MG/DL (ref 8–23)
BUN/CREAT BLD: 18 (ref 9–20)
BUN/CREAT BLD: ABNORMAL (ref 9–20)
BUPRENORPHINE URINE: NORMAL
CALCIUM IONIZED: 0.96 MMOL/L (ref 1.13–1.33)
CALCIUM IONIZED: 1.09 MMOL/L (ref 1.13–1.33)
CALCIUM SERPL-MCNC: 10 MG/DL (ref 8.6–10.4)
CALCIUM SERPL-MCNC: 6.9 MG/DL (ref 8.6–10.4)
CALCIUM SERPL-MCNC: 7.1 MG/DL (ref 8.6–10.4)
CALCIUM SERPL-MCNC: 7.2 MG/DL (ref 8.6–10.4)
CALCIUM SERPL-MCNC: 7.4 MG/DL (ref 8.6–10.4)
CALCIUM SERPL-MCNC: 8 MG/DL (ref 8.6–10.4)
CALCIUM SERPL-MCNC: 8.8 MG/DL (ref 8.6–10.4)
CANNABINOID SCREEN URINE: NEGATIVE
CASTS UA: ABNORMAL /LPF (ref 0–2)
CHLORIDE BLD-SCNC: 91 MMOL/L (ref 98–107)
CHLORIDE BLD-SCNC: 94 MMOL/L (ref 98–107)
CHLORIDE BLD-SCNC: 94 MMOL/L (ref 98–107)
CHLORIDE BLD-SCNC: 95 MMOL/L (ref 98–107)
CHLORIDE BLD-SCNC: 95 MMOL/L (ref 98–107)
CHLORIDE BLD-SCNC: 96 MMOL/L (ref 98–107)
CHLORIDE BLD-SCNC: 98 MMOL/L (ref 98–107)
CHLORIDE BLD-SCNC: 99 MMOL/L (ref 98–107)
CHLORIDE, UR: <20 MMOL/L
CO2: 15 MMOL/L (ref 20–31)
CO2: 16 MMOL/L (ref 20–31)
CO2: 18 MMOL/L (ref 20–31)
CO2: 19 MMOL/L (ref 20–31)
CO2: 21 MMOL/L (ref 20–31)
COCAINE METABOLITE, URINE: NEGATIVE
COLOR: ABNORMAL
COMPLEMENT C3: 113 MG/DL (ref 90–180)
COMPLEMENT C4: 23 MG/DL (ref 10–40)
CREAT SERPL-MCNC: 4.35 MG/DL (ref 0.7–1.2)
CREAT SERPL-MCNC: 4.87 MG/DL (ref 0.7–1.2)
CREAT SERPL-MCNC: 4.99 MG/DL (ref 0.7–1.2)
CREAT SERPL-MCNC: 5.37 MG/DL (ref 0.7–1.2)
CREAT SERPL-MCNC: 5.92 MG/DL (ref 0.7–1.2)
CREAT SERPL-MCNC: 6.64 MG/DL (ref 0.7–1.2)
CREAT SERPL-MCNC: 6.8 MG/DL (ref 0.7–1.2)
CREATININE URINE: 166.5 MG/DL (ref 39–259)
CREATININE URINE: 191.6 MG/DL (ref 39–259)
CROSSMATCH RESULT: NORMAL
CRYSTALS, UA: ABNORMAL /HPF
CULTURE: NORMAL
D-DIMER QUANTITATIVE: 3.4 MG/L FEU
DATE AND TIME: NORMAL
DIFFERENTIAL TYPE: ABNORMAL
DIRECT EXAM: NORMAL
DISPENSE STATUS BLOOD BANK: NORMAL
EKG ATRIAL RATE: 85 BPM
EKG P AXIS: 54 DEGREES
EKG P-R INTERVAL: 144 MS
EKG Q-T INTERVAL: 396 MS
EKG QRS DURATION: 90 MS
EKG QTC CALCULATION (BAZETT): 471 MS
EKG R AXIS: 81 DEGREES
EKG T AXIS: 33 DEGREES
EKG VENTRICULAR RATE: 85 BPM
EOSINOPHILS RELATIVE PERCENT: 0 % (ref 1–4)
EOSINOPHILS RELATIVE PERCENT: 1 % (ref 1–4)
EOSINOPHILS RELATIVE PERCENT: 1 % (ref 1–4)
EPITHELIAL CELLS UA: ABNORMAL /HPF (ref 0–5)
ESTIMATED AVERAGE GLUCOSE: 117 MG/DL
ETHANOL PERCENT: <0.01 %
ETHANOL: <10 MG/DL
ETHANOL: NORMAL (ref 0–0.08)
ETHYLENE GLYCOL: NORMAL
EXPIRATION DATE: NORMAL
FIBRINOGEN: 357 MG/DL (ref 140–420)
FIO2: 100
FIO2: 21
FIO2: 50
FREE KAPPA/LAMBDA RATIO: 1.44 (ref 0.26–1.65)
GFR AFRICAN AMERICAN: 10 ML/MIN
GFR AFRICAN AMERICAN: 10 ML/MIN
GFR AFRICAN AMERICAN: 12 ML/MIN
GFR AFRICAN AMERICAN: 13 ML/MIN
GFR AFRICAN AMERICAN: 14 ML/MIN
GFR AFRICAN AMERICAN: 15 ML/MIN
GFR AFRICAN AMERICAN: 17 ML/MIN
GFR NON-AFRICAN AMERICAN: 10 ML/MIN
GFR NON-AFRICAN AMERICAN: 11 ML/MIN
GFR NON-AFRICAN AMERICAN: 12 ML/MIN
GFR NON-AFRICAN AMERICAN: 12 ML/MIN
GFR NON-AFRICAN AMERICAN: 14 ML/MIN
GFR NON-AFRICAN AMERICAN: 7 ML/MIN
GFR NON-AFRICAN AMERICAN: 8 ML/MIN
GFR NON-AFRICAN AMERICAN: 8 ML/MIN
GFR SERPL CREATININE-BSD FRML MDRD: 9 ML/MIN
GFR SERPL CREATININE-BSD FRML MDRD: ABNORMAL ML/MIN/{1.73_M2}
GLOBULIN: ABNORMAL G/DL (ref 1.5–3.8)
GLUCOSE BLD-MCNC: 108 MG/DL (ref 75–110)
GLUCOSE BLD-MCNC: 116 MG/DL (ref 75–110)
GLUCOSE BLD-MCNC: 118 MG/DL (ref 75–110)
GLUCOSE BLD-MCNC: 120 MG/DL (ref 75–110)
GLUCOSE BLD-MCNC: 122 MG/DL (ref 75–110)
GLUCOSE BLD-MCNC: 123 MG/DL (ref 75–110)
GLUCOSE BLD-MCNC: 125 MG/DL (ref 70–99)
GLUCOSE BLD-MCNC: 127 MG/DL (ref 75–110)
GLUCOSE BLD-MCNC: 128 MG/DL (ref 70–99)
GLUCOSE BLD-MCNC: 133 MG/DL (ref 75–110)
GLUCOSE BLD-MCNC: 134 MG/DL (ref 70–99)
GLUCOSE BLD-MCNC: 136 MG/DL (ref 70–99)
GLUCOSE BLD-MCNC: 137 MG/DL (ref 75–110)
GLUCOSE BLD-MCNC: 141 MG/DL (ref 75–110)
GLUCOSE BLD-MCNC: 141 MG/DL (ref 75–110)
GLUCOSE BLD-MCNC: 142 MG/DL (ref 75–110)
GLUCOSE BLD-MCNC: 144 MG/DL (ref 75–110)
GLUCOSE BLD-MCNC: 147 MG/DL (ref 75–110)
GLUCOSE BLD-MCNC: 148 MG/DL (ref 70–99)
GLUCOSE BLD-MCNC: 151 MG/DL (ref 75–110)
GLUCOSE BLD-MCNC: 164 MG/DL (ref 75–110)
GLUCOSE BLD-MCNC: 76 MG/DL (ref 70–99)
GLUCOSE BLD-MCNC: 81 MG/DL (ref 74–100)
GLUCOSE BLD-MCNC: 83 MG/DL (ref 75–110)
GLUCOSE BLD-MCNC: 96 MG/DL (ref 70–99)
GLUCOSE URINE: NEGATIVE
HAV IGM SER IA-ACNC: NONREACTIVE
HBA1C MFR BLD: 5.7 % (ref 4–6)
HBV SURFACE AB TITR SER: <3.5 MIU/ML
HCT VFR BLD CALC: 21.8 % (ref 40.7–50.3)
HCT VFR BLD CALC: 22.5 % (ref 40.7–50.3)
HCT VFR BLD CALC: 22.5 % (ref 40.7–50.3)
HCT VFR BLD CALC: 22.8 % (ref 40.7–50.3)
HCT VFR BLD CALC: 22.8 % (ref 40.7–50.3)
HCT VFR BLD CALC: 23 % (ref 40.7–50.3)
HCT VFR BLD CALC: 23.3 % (ref 40.7–50.3)
HCT VFR BLD CALC: 23.5 % (ref 40.7–50.3)
HCT VFR BLD CALC: 23.5 % (ref 40.7–50.3)
HCT VFR BLD CALC: 23.7 % (ref 40.7–50.3)
HCT VFR BLD CALC: 24 % (ref 40.7–50.3)
HCT VFR BLD CALC: 24.2 % (ref 40.7–50.3)
HCT VFR BLD CALC: 24.3 % (ref 40.7–50.3)
HCT VFR BLD CALC: 25.3 % (ref 40.7–50.3)
HCT VFR BLD CALC: 27.8 % (ref 40.7–50.3)
HCT VFR BLD CALC: 29.9 % (ref 40.7–50.3)
HEMOGLOBIN: 7 G/DL (ref 13–17)
HEMOGLOBIN: 7.2 G/DL (ref 13–17)
HEMOGLOBIN: 7.4 G/DL (ref 13–17)
HEMOGLOBIN: 7.6 G/DL (ref 13–17)
HEMOGLOBIN: 7.7 G/DL (ref 13–17)
HEMOGLOBIN: 7.8 G/DL (ref 13–17)
HEMOGLOBIN: 7.8 G/DL (ref 13–17)
HEMOGLOBIN: 7.9 G/DL (ref 13–17)
HEMOGLOBIN: 8 G/DL (ref 13–17)
HEMOGLOBIN: 8.1 G/DL (ref 13–17)
HEMOGLOBIN: 8.4 G/DL (ref 13–17)
HEMOGLOBIN: 9.3 G/DL (ref 13–17)
HEPATITIS B CORE IGM ANTIBODY: NONREACTIVE
HEPATITIS B CORE TOTAL ANTIBODY: NONREACTIVE
HEPATITIS B SURFACE ANTIGEN: NONREACTIVE
HEPATITIS B SURFACE ANTIGEN: NONREACTIVE
HEPATITIS C ANTIBODY: NONREACTIVE
IMMATURE GRANULOCYTES: 0 %
IMMATURE GRANULOCYTES: 1 %
IMMATURE GRANULOCYTES: 1 %
IMMATURE GRANULOCYTES: 2 %
IMMATURE GRANULOCYTES: 2 %
IMMATURE GRANULOCYTES: 6 %
INR BLD: 1.3
INR BLD: 1.4
INR BLD: 1.5
INR BLD: 1.6
INR BLD: 1.7
ISOPROPANOL BLOOD: NORMAL
KAPPA FREE LIGHT CHAINS QNT: 23.21 MG/DL (ref 0.37–1.94)
KETONES, URINE: NEGATIVE
LACTIC ACID, SEPSIS WHOLE BLOOD: 2.8 MMOL/L (ref 0.5–1.9)
LACTIC ACID, SEPSIS: ABNORMAL MMOL/L (ref 0.5–1.9)
LACTIC ACID, WHOLE BLOOD: 2.4 MMOL/L (ref 0.7–2.1)
LACTIC ACID, WHOLE BLOOD: 2.5 MMOL/L (ref 0.7–2.1)
LACTIC ACID, WHOLE BLOOD: 2.7 MMOL/L (ref 0.7–2.1)
LACTIC ACID, WHOLE BLOOD: 2.8 MMOL/L (ref 0.7–2.1)
LACTIC ACID, WHOLE BLOOD: 3.8 MMOL/L (ref 0.7–2.1)
LACTIC ACID, WHOLE BLOOD: 4.3 MMOL/L (ref 0.7–2.1)
LACTIC ACID, WHOLE BLOOD: 5.5 MMOL/L (ref 0.7–2.1)
LACTIC ACID, WHOLE BLOOD: 5.5 MMOL/L (ref 0.7–2.1)
LACTIC ACID: 8 MMOL/L (ref 0.5–2.2)
LACTIC ACID: ABNORMAL MMOL/L
LAMBDA FREE LIGHT CHAINS QNT: 16.16 MG/DL (ref 0.57–2.63)
LEUKOCYTE ESTERASE, URINE: ABNORMAL
LIPASE: 188 U/L (ref 13–60)
LIPASE: 214 U/L (ref 13–60)
LIVER-KIDNEY MICROSOMAL AB: NORMAL
LYMPHOCYTES # BLD: 10 % (ref 24–44)
LYMPHOCYTES # BLD: 13 % (ref 24–44)
LYMPHOCYTES # BLD: 5 % (ref 24–44)
LYMPHOCYTES # BLD: 7 % (ref 24–44)
LYMPHOCYTES # BLD: 7 % (ref 24–44)
LYMPHOCYTES # BLD: 9 % (ref 24–44)
Lab: NORMAL
MAGNESIUM: 1.5 MG/DL (ref 1.6–2.6)
MAGNESIUM: 1.9 MG/DL (ref 1.6–2.6)
MAGNESIUM: 2.2 MG/DL (ref 1.6–2.6)
MAGNESIUM: 2.2 MG/DL (ref 1.6–2.6)
MAGNESIUM: 2.5 MG/DL (ref 1.6–2.6)
MAGNESIUM: 2.6 MG/DL (ref 1.6–2.6)
MAGNESIUM: 2.6 MG/DL (ref 1.6–2.6)
MCH RBC QN AUTO: 30.9 PG (ref 25.2–33.5)
MCH RBC QN AUTO: 31 PG (ref 25.2–33.5)
MCH RBC QN AUTO: 31.5 PG (ref 25.2–33.5)
MCH RBC QN AUTO: 31.5 PG (ref 25.2–33.5)
MCH RBC QN AUTO: 31.7 PG (ref 25.2–33.5)
MCH RBC QN AUTO: 32.3 PG (ref 25.2–33.5)
MCH RBC QN AUTO: 32.6 PG (ref 25.2–33.5)
MCHC RBC AUTO-ENTMCNC: 31.1 G/DL (ref 28.4–34.8)
MCHC RBC AUTO-ENTMCNC: 31.6 G/DL (ref 28.4–34.8)
MCHC RBC AUTO-ENTMCNC: 32 G/DL (ref 28.4–34.8)
MCHC RBC AUTO-ENTMCNC: 32.1 G/DL (ref 28.4–34.8)
MCHC RBC AUTO-ENTMCNC: 32.2 G/DL (ref 28.4–34.8)
MCHC RBC AUTO-ENTMCNC: 32.9 G/DL (ref 28.4–34.8)
MCHC RBC AUTO-ENTMCNC: 32.9 G/DL (ref 28.4–34.8)
MCV RBC AUTO: 100.4 FL (ref 82.6–102.9)
MCV RBC AUTO: 96.6 FL (ref 82.6–102.9)
MCV RBC AUTO: 97.6 FL (ref 82.6–102.9)
MCV RBC AUTO: 98.2 FL (ref 82.6–102.9)
MCV RBC AUTO: 98.3 FL (ref 82.6–102.9)
MCV RBC AUTO: 99.1 FL (ref 82.6–102.9)
MCV RBC AUTO: 99.7 FL (ref 82.6–102.9)
MDMA URINE: NORMAL
METHADONE SCREEN, URINE: NEGATIVE
METHAMPHETAMINE, URINE: NORMAL
METHANOL BLOOD: NORMAL
MITOCHONDRIAL ANTIBODY: 3.6 UNITS (ref 0–20)
MODE: ABNORMAL
MONOCYTES # BLD: 3 % (ref 1–7)
MONOCYTES # BLD: 3 % (ref 1–7)
MONOCYTES # BLD: 4 % (ref 1–7)
MONOCYTES # BLD: 5 % (ref 1–7)
MONOCYTES # BLD: 8 % (ref 1–7)
MONOCYTES # BLD: 9 % (ref 1–7)
MORPHOLOGY: ABNORMAL
MORPHOLOGY: NORMAL
MRSA, DNA, NASAL: NORMAL
MUCUS: ABNORMAL
MYOGLOBIN: 1565 NG/ML (ref 28–72)
MYOGLOBIN: 550 NG/ML (ref 28–72)
NEGATIVE BASE EXCESS, ART: 3 (ref 0–2)
NEGATIVE BASE EXCESS, ART: 3 (ref 0–2)
NEGATIVE BASE EXCESS, ART: ABNORMAL (ref 0–2)
NITRITE, URINE: POSITIVE
NOTIFY: NORMAL
NRBC AUTOMATED: 14.4 PER 100 WBC
NRBC AUTOMATED: 14.8 PER 100 WBC
NRBC AUTOMATED: 15 PER 100 WBC
NRBC AUTOMATED: 17.4 PER 100 WBC
NRBC AUTOMATED: 23.3 PER 100 WBC
NRBC AUTOMATED: 28.9 PER 100 WBC
NRBC AUTOMATED: 33.2 PER 100 WBC
NUCLEATED RED BLOOD CELLS: 13 PER 100 WBC
NUCLEATED RED BLOOD CELLS: 17 PER 100 WBC
NUCLEATED RED BLOOD CELLS: 23 PER 100 WBC
NUCLEATED RED BLOOD CELLS: 35 PER 100 WBC
NUCLEATED RED BLOOD CELLS: 39 PER 100 WBC
NUCLEATED RED BLOOD CELLS: 67 PER 100 WBC
O2 DEVICE/FLOW/%: ABNORMAL
OPIATES, URINE: NEGATIVE
OTHER OBSERVATIONS UA: ABNORMAL
OXYCODONE SCREEN URINE: NEGATIVE
PARTIAL THROMBOPLASTIN TIME: 35.7 SEC (ref 20.5–30.5)
PARTIAL THROMBOPLASTIN TIME: 51.2 SEC (ref 23.9–33.8)
PATIENT TEMP: ABNORMAL
PDW BLD-RTO: 22.6 % (ref 11.8–14.4)
PDW BLD-RTO: 22.9 % (ref 11.8–14.4)
PDW BLD-RTO: 23.5 % (ref 11.8–14.4)
PDW BLD-RTO: 24.8 % (ref 11.8–14.4)
PDW BLD-RTO: 25.6 % (ref 11.8–14.4)
PDW BLD-RTO: 26.5 % (ref 11.8–14.4)
PDW BLD-RTO: 26.7 % (ref 11.8–14.4)
PH UA: 5 (ref 5–8)
PHENCYCLIDINE, URINE: NEGATIVE
PHOSPHORUS: 5.2 MG/DL (ref 2.5–4.5)
PLATELET # BLD: 180 K/UL (ref 138–453)
PLATELET # BLD: 209 K/UL (ref 138–453)
PLATELET # BLD: 210 K/UL (ref 138–453)
PLATELET # BLD: 229 K/UL (ref 138–453)
PLATELET # BLD: 241 K/UL (ref 138–453)
PLATELET # BLD: 244 K/UL (ref 138–453)
PLATELET # BLD: 252 K/UL (ref 138–453)
PLATELET ESTIMATE: ABNORMAL
PMV BLD AUTO: 11 FL (ref 8.1–13.5)
PMV BLD AUTO: 11 FL (ref 8.1–13.5)
PMV BLD AUTO: 11.2 FL (ref 8.1–13.5)
PMV BLD AUTO: 11.8 FL (ref 8.1–13.5)
PMV BLD AUTO: 12 FL (ref 8.1–13.5)
PMV BLD AUTO: 12.2 FL (ref 8.1–13.5)
PMV BLD AUTO: 12.2 FL (ref 8.1–13.5)
POC CHLORIDE: 106 MMOL/L (ref 98–107)
POC CREATININE: 7.5 MG/DL (ref 0.51–1.19)
POC HCO3: 19.6 MMOL/L (ref 21–28)
POC HCO3: 19.7 MMOL/L (ref 21–28)
POC HCO3: 25.9 MMOL/L (ref 21–28)
POC HEMATOCRIT: 54 % (ref 41–53)
POC HEMOGLOBIN: 18.2 G/DL (ref 13.5–17.5)
POC IONIZED CALCIUM: 1.02 MMOL/L (ref 1.15–1.33)
POC LACTIC ACID: 3.6 MMOL/L (ref 0.56–1.39)
POC O2 SATURATION: 97 % (ref 94–98)
POC O2 SATURATION: 98 % (ref 94–98)
POC O2 SATURATION: 99 % (ref 94–98)
POC PCO2 TEMP: ABNORMAL MM HG
POC PCO2: 25.5 MM HG (ref 35–48)
POC PCO2: 29.4 MM HG (ref 35–48)
POC PCO2: 42 MM HG (ref 35–48)
POC PH TEMP: ABNORMAL
POC PH: 7.4 (ref 7.35–7.45)
POC PH: 7.43 (ref 7.35–7.45)
POC PH: 7.5 (ref 7.35–7.45)
POC PO2 TEMP: ABNORMAL MM HG
POC PO2: 116.1 MM HG (ref 83–108)
POC PO2: 139.8 MM HG (ref 83–108)
POC PO2: 83 MM HG (ref 83–108)
POC POTASSIUM: 3.2 MMOL/L (ref 3.5–4.5)
POC SODIUM: 139 MMOL/L (ref 138–146)
POSITIVE BASE EXCESS, ART: 1 (ref 0–3)
POSITIVE BASE EXCESS, ART: ABNORMAL (ref 0–3)
POSITIVE BASE EXCESS, ART: ABNORMAL (ref 0–3)
POTASSIUM SERPL-SCNC: 2.3 MMOL/L (ref 3.7–5.3)
POTASSIUM SERPL-SCNC: 2.4 MMOL/L (ref 3.7–5.3)
POTASSIUM SERPL-SCNC: 2.5 MMOL/L (ref 3.7–5.3)
POTASSIUM SERPL-SCNC: 2.8 MMOL/L (ref 3.7–5.3)
POTASSIUM SERPL-SCNC: 3 MMOL/L (ref 3.7–5.3)
POTASSIUM SERPL-SCNC: 3.3 MMOL/L (ref 3.7–5.3)
POTASSIUM SERPL-SCNC: 3.4 MMOL/L (ref 3.7–5.3)
POTASSIUM SERPL-SCNC: 3.4 MMOL/L (ref 3.7–5.3)
POTASSIUM SERPL-SCNC: 3.5 MMOL/L (ref 3.7–5.3)
POTASSIUM SERPL-SCNC: 3.6 MMOL/L (ref 3.7–5.3)
POTASSIUM, UR: 45.5 MMOL/L
PROPOXYPHENE, URINE: NORMAL
PROTEIN UA: ABNORMAL
PROTHROMBIN TIME: 13.9 SEC (ref 9–12)
PROTHROMBIN TIME: 14.4 SEC (ref 9–12)
PROTHROMBIN TIME: 14.8 SEC (ref 9–12)
PROTHROMBIN TIME: 15.3 SEC (ref 9–12)
PROTHROMBIN TIME: 15.4 SEC (ref 9–12)
PROTHROMBIN TIME: 16 SEC (ref 9–12)
PROTHROMBIN TIME: 16.4 SEC (ref 9–12)
PROTHROMBIN TIME: 17 SEC (ref 9–12)
PROTHROMBIN TIME: 17.2 SEC (ref 11.5–14.2)
RBC # BLD: 2.22 M/UL (ref 4.21–5.77)
RBC # BLD: 2.27 M/UL (ref 4.21–5.77)
RBC # BLD: 2.27 M/UL (ref 4.21–5.77)
RBC # BLD: 2.29 M/UL (ref 4.21–5.77)
RBC # BLD: 2.48 M/UL (ref 4.21–5.77)
RBC # BLD: 2.62 M/UL (ref 4.21–5.77)
RBC # BLD: 3 M/UL (ref 4.21–5.77)
RBC # BLD: ABNORMAL 10*6/UL
RBC UA: ABNORMAL /HPF (ref 0–2)
READ BACK: YES
REASON FOR REJECTION: NORMAL
RENAL EPITHELIAL, UA: ABNORMAL /HPF
SAMPLE SITE: ABNORMAL
SEG NEUTROPHILS: 76 % (ref 36–66)
SEG NEUTROPHILS: 79 % (ref 36–66)
SEG NEUTROPHILS: 81 % (ref 36–66)
SEG NEUTROPHILS: 86 % (ref 36–66)
SEG NEUTROPHILS: 89 % (ref 36–66)
SEG NEUTROPHILS: 91 % (ref 36–66)
SEGMENTED NEUTROPHILS ABSOLUTE COUNT: 10.82 K/UL (ref 1.8–7.7)
SEGMENTED NEUTROPHILS ABSOLUTE COUNT: 11.63 K/UL (ref 1.8–7.7)
SEGMENTED NEUTROPHILS ABSOLUTE COUNT: 11.73 K/UL (ref 1.8–7.7)
SEGMENTED NEUTROPHILS ABSOLUTE COUNT: 12.64 K/UL (ref 1.8–7.7)
SEGMENTED NEUTROPHILS ABSOLUTE COUNT: 15.39 K/UL (ref 1.8–7.7)
SEGMENTED NEUTROPHILS ABSOLUTE COUNT: 18.64 K/UL (ref 1.8–7.7)
SERUM OSMOLALITY: 347 MOSM/KG (ref 275–295)
SMOOTH MUSCLE ANTIBODY: ABNORMAL
SODIUM BLD-SCNC: 135 MMOL/L (ref 135–144)
SODIUM BLD-SCNC: 136 MMOL/L (ref 135–144)
SODIUM BLD-SCNC: 136 MMOL/L (ref 135–144)
SODIUM BLD-SCNC: 139 MMOL/L (ref 135–144)
SODIUM BLD-SCNC: 139 MMOL/L (ref 135–144)
SODIUM BLD-SCNC: 141 MMOL/L (ref 135–144)
SODIUM BLD-SCNC: 142 MMOL/L (ref 135–144)
SODIUM BLD-SCNC: 142 MMOL/L (ref 135–144)
SODIUM BLD-SCNC: 143 MMOL/L (ref 135–144)
SODIUM BLD-SCNC: 144 MMOL/L (ref 135–144)
SODIUM,UR: 38 MMOL/L
SPECIFIC GRAVITY UA: 1.02 (ref 1–1.03)
SPECIMEN DESCRIPTION: NORMAL
SPECIMEN DESCRIPTION: NORMAL
TCO2 (CALC), ART: 21 MMOL/L (ref 22–29)
TCO2 (CALC), ART: 21 MMOL/L (ref 22–29)
TCO2 (CALC), ART: 27 MMOL/L (ref 22–29)
TEST INFORMATION: NORMAL
TOTAL CK: 400 U/L (ref 39–308)
TOTAL CK: 932 U/L (ref 39–308)
TOTAL PROTEIN, URINE: 106 MG/DL
TOTAL PROTEIN, URINE: 146 MG/DL
TOTAL PROTEIN: 5.1 G/DL (ref 6.4–8.3)
TOTAL PROTEIN: 5.1 G/DL (ref 6.4–8.3)
TOTAL PROTEIN: 5.2 G/DL (ref 6.4–8.3)
TOTAL PROTEIN: 5.4 G/DL (ref 6.4–8.3)
TOTAL PROTEIN: 5.5 G/DL (ref 6.4–8.3)
TOTAL PROTEIN: 5.6 G/DL (ref 6.4–8.3)
TOTAL PROTEIN: 5.9 G/DL (ref 6.4–8.3)
TRANSFUSION STATUS: NORMAL
TRICHOMONAS: ABNORMAL
TRICYCLIC ANTIDEPRESSANTS, UR: NORMAL
TRIGL SERPL-MCNC: 177 MG/DL
TROPONIN INTERP: ABNORMAL
TROPONIN T: ABNORMAL NG/ML
TROPONIN, HIGH SENSITIVITY: 64 NG/L (ref 0–22)
TURBIDITY: ABNORMAL
UNIT DIVISION: 0
UNIT NUMBER: NORMAL
URINE HGB: ABNORMAL
URINE TOTAL PROTEIN CREATININE RATIO: 0.88 (ref 0–0.2)
UROBILINOGEN, URINE: NORMAL
WBC # BLD: 13.1 K/UL (ref 3.5–11.3)
WBC # BLD: 13.7 K/UL (ref 3.5–11.3)
WBC # BLD: 14.2 K/UL (ref 3.5–11.3)
WBC # BLD: 14.5 K/UL (ref 3.5–11.3)
WBC # BLD: 15.3 K/UL (ref 3.5–11.3)
WBC # BLD: 17.9 K/UL (ref 3.5–11.3)
WBC # BLD: 20.5 K/UL (ref 3.5–11.3)
WBC # BLD: ABNORMAL 10*3/UL
WBC UA: ABNORMAL /HPF (ref 0–5)
YEAST: ABNORMAL
ZZ NTE CLEAN UP: ORDERED TEST: NORMAL
ZZ NTE WITH NAME CLEAN UP: SPECIMEN SOURCE: NORMAL

## 2020-01-01 PROCEDURE — 6370000000 HC RX 637 (ALT 250 FOR IP): Performed by: INTERNAL MEDICINE

## 2020-01-01 PROCEDURE — APPSS30 APP SPLIT SHARED TIME 16-30 MINUTES: Performed by: PHYSICIAN ASSISTANT

## 2020-01-01 PROCEDURE — 2580000003 HC RX 258: Performed by: STUDENT IN AN ORGANIZED HEALTH CARE EDUCATION/TRAINING PROGRAM

## 2020-01-01 PROCEDURE — 70450 CT HEAD/BRAIN W/O DYE: CPT

## 2020-01-01 PROCEDURE — 6360000002 HC RX W HCPCS: Performed by: PHYSICIAN ASSISTANT

## 2020-01-01 PROCEDURE — 6360000002 HC RX W HCPCS: Performed by: STUDENT IN AN ORGANIZED HEALTH CARE EDUCATION/TRAINING PROGRAM

## 2020-01-01 PROCEDURE — 86376 MICROSOMAL ANTIBODY EACH: CPT

## 2020-01-01 PROCEDURE — 94761 N-INVAS EAR/PLS OXIMETRY MLT: CPT

## 2020-01-01 PROCEDURE — 94002 VENT MGMT INPAT INIT DAY: CPT

## 2020-01-01 PROCEDURE — 6360000002 HC RX W HCPCS: Performed by: EMERGENCY MEDICINE

## 2020-01-01 PROCEDURE — 86256 FLUORESCENT ANTIBODY TITER: CPT

## 2020-01-01 PROCEDURE — P9047 ALBUMIN (HUMAN), 25%, 50ML: HCPCS | Performed by: INTERNAL MEDICINE

## 2020-01-01 PROCEDURE — 82435 ASSAY OF BLOOD CHLORIDE: CPT

## 2020-01-01 PROCEDURE — 86704 HEP B CORE ANTIBODY TOTAL: CPT

## 2020-01-01 PROCEDURE — 83735 ASSAY OF MAGNESIUM: CPT

## 2020-01-01 PROCEDURE — 80048 BASIC METABOLIC PNL TOTAL CA: CPT

## 2020-01-01 PROCEDURE — 83690 ASSAY OF LIPASE: CPT

## 2020-01-01 PROCEDURE — 99233 SBSQ HOSP IP/OBS HIGH 50: CPT | Performed by: INTERNAL MEDICINE

## 2020-01-01 PROCEDURE — 82436 ASSAY OF URINE CHLORIDE: CPT

## 2020-01-01 PROCEDURE — 2500000003 HC RX 250 WO HCPCS: Performed by: STUDENT IN AN ORGANIZED HEALTH CARE EDUCATION/TRAINING PROGRAM

## 2020-01-01 PROCEDURE — 86927 PLASMA FRESH FROZEN: CPT

## 2020-01-01 PROCEDURE — 36620 INSERTION CATHETER ARTERY: CPT

## 2020-01-01 PROCEDURE — 85018 HEMOGLOBIN: CPT

## 2020-01-01 PROCEDURE — 71045 X-RAY EXAM CHEST 1 VIEW: CPT

## 2020-01-01 PROCEDURE — 80076 HEPATIC FUNCTION PANEL: CPT

## 2020-01-01 PROCEDURE — 84166 PROTEIN E-PHORESIS/URINE/CSF: CPT

## 2020-01-01 PROCEDURE — 6360000002 HC RX W HCPCS

## 2020-01-01 PROCEDURE — 96365 THER/PROPH/DIAG IV INF INIT: CPT

## 2020-01-01 PROCEDURE — 82803 BLOOD GASES ANY COMBINATION: CPT

## 2020-01-01 PROCEDURE — 86335 IMMUNFIX E-PHORSIS/URINE/CSF: CPT

## 2020-01-01 PROCEDURE — 86334 IMMUNOFIX E-PHORESIS SERUM: CPT

## 2020-01-01 PROCEDURE — 85014 HEMATOCRIT: CPT

## 2020-01-01 PROCEDURE — 84133 ASSAY OF URINE POTASSIUM: CPT

## 2020-01-01 PROCEDURE — 87040 BLOOD CULTURE FOR BACTERIA: CPT

## 2020-01-01 PROCEDURE — 80051 ELECTROLYTE PANEL: CPT

## 2020-01-01 PROCEDURE — 6370000000 HC RX 637 (ALT 250 FOR IP): Performed by: STUDENT IN AN ORGANIZED HEALTH CARE EDUCATION/TRAINING PROGRAM

## 2020-01-01 PROCEDURE — 83874 ASSAY OF MYOGLOBIN: CPT

## 2020-01-01 PROCEDURE — 99232 SBSQ HOSP IP/OBS MODERATE 35: CPT | Performed by: INTERNAL MEDICINE

## 2020-01-01 PROCEDURE — 2700000000 HC OXYGEN THERAPY PER DAY

## 2020-01-01 PROCEDURE — 6360000002 HC RX W HCPCS: Performed by: INTERNAL MEDICINE

## 2020-01-01 PROCEDURE — P9016 RBC LEUKOCYTES REDUCED: HCPCS

## 2020-01-01 PROCEDURE — 51702 INSERT TEMP BLADDER CATH: CPT

## 2020-01-01 PROCEDURE — 84478 ASSAY OF TRIGLYCERIDES: CPT

## 2020-01-01 PROCEDURE — 86900 BLOOD TYPING SEROLOGIC ABO: CPT

## 2020-01-01 PROCEDURE — 76770 US EXAM ABDO BACK WALL COMP: CPT

## 2020-01-01 PROCEDURE — 84156 ASSAY OF PROTEIN URINE: CPT

## 2020-01-01 PROCEDURE — 82140 ASSAY OF AMMONIA: CPT

## 2020-01-01 PROCEDURE — 2500000003 HC RX 250 WO HCPCS: Performed by: INTERNAL MEDICINE

## 2020-01-01 PROCEDURE — 99291 CRITICAL CARE FIRST HOUR: CPT | Performed by: INTERNAL MEDICINE

## 2020-01-01 PROCEDURE — 61020 REMOVE BRAIN CAVITY FLUID: CPT | Performed by: NEUROLOGICAL SURGERY

## 2020-01-01 PROCEDURE — 87205 SMEAR GRAM STAIN: CPT

## 2020-01-01 PROCEDURE — G0480 DRUG TEST DEF 1-7 CLASSES: HCPCS

## 2020-01-01 PROCEDURE — 6370000000 HC RX 637 (ALT 250 FOR IP): Performed by: EMERGENCY MEDICINE

## 2020-01-01 PROCEDURE — 85384 FIBRINOGEN ACTIVITY: CPT

## 2020-01-01 PROCEDURE — 82103 ALPHA-1-ANTITRYPSIN TOTAL: CPT

## 2020-01-01 PROCEDURE — 85610 PROTHROMBIN TIME: CPT

## 2020-01-01 PROCEDURE — 87340 HEPATITIS B SURFACE AG IA: CPT

## 2020-01-01 PROCEDURE — 2580000003 HC RX 258: Performed by: INTERNAL MEDICINE

## 2020-01-01 PROCEDURE — 2000000000 HC ICU R&B

## 2020-01-01 PROCEDURE — 85027 COMPLETE CBC AUTOMATED: CPT

## 2020-01-01 PROCEDURE — 85025 COMPLETE CBC W/AUTO DIFF WBC: CPT

## 2020-01-01 PROCEDURE — 82550 ASSAY OF CK (CPK): CPT

## 2020-01-01 PROCEDURE — 84132 ASSAY OF SERUM POTASSIUM: CPT

## 2020-01-01 PROCEDURE — C9113 INJ PANTOPRAZOLE SODIUM, VIA: HCPCS | Performed by: STUDENT IN AN ORGANIZED HEALTH CARE EDUCATION/TRAINING PROGRAM

## 2020-01-01 PROCEDURE — 94003 VENT MGMT INPAT SUBQ DAY: CPT

## 2020-01-01 PROCEDURE — 90935 HEMODIALYSIS ONE EVALUATION: CPT

## 2020-01-01 PROCEDURE — 83516 IMMUNOASSAY NONANTIBODY: CPT

## 2020-01-01 PROCEDURE — 82330 ASSAY OF CALCIUM: CPT

## 2020-01-01 PROCEDURE — 31720 CLEARANCE OF AIRWAYS: CPT

## 2020-01-01 PROCEDURE — 83605 ASSAY OF LACTIC ACID: CPT

## 2020-01-01 PROCEDURE — 2580000003 HC RX 258: Performed by: PHYSICIAN ASSISTANT

## 2020-01-01 PROCEDURE — 2500000003 HC RX 250 WO HCPCS: Performed by: NEUROLOGICAL SURGERY

## 2020-01-01 PROCEDURE — 2580000003 HC RX 258: Performed by: NEUROLOGICAL SURGERY

## 2020-01-01 PROCEDURE — 84600 ASSAY OF VOLATILES: CPT

## 2020-01-01 PROCEDURE — APPSS45 APP SPLIT SHARED TIME 31-45 MINUTES: Performed by: PHYSICIAN ASSISTANT

## 2020-01-01 PROCEDURE — 85730 THROMBOPLASTIN TIME PARTIAL: CPT

## 2020-01-01 PROCEDURE — 0BH18EZ INSERTION OF ENDOTRACHEAL AIRWAY INTO TRACHEA, VIA NATURAL OR ARTIFICIAL OPENING ENDOSCOPIC: ICD-10-PCS | Performed by: STUDENT IN AN ORGANIZED HEALTH CARE EDUCATION/TRAINING PROGRAM

## 2020-01-01 PROCEDURE — 5A1945Z RESPIRATORY VENTILATION, 24-96 CONSECUTIVE HOURS: ICD-10-PCS | Performed by: STUDENT IN AN ORGANIZED HEALTH CARE EDUCATION/TRAINING PROGRAM

## 2020-01-01 PROCEDURE — 93005 ELECTROCARDIOGRAM TRACING: CPT | Performed by: EMERGENCY MEDICINE

## 2020-01-01 PROCEDURE — 80053 COMPREHEN METABOLIC PANEL: CPT

## 2020-01-01 PROCEDURE — 86255 FLUORESCENT ANTIBODY SCREEN: CPT

## 2020-01-01 PROCEDURE — APPSS45 APP SPLIT SHARED TIME 31-45 MINUTES: Performed by: REGISTERED NURSE

## 2020-01-01 PROCEDURE — 82565 ASSAY OF CREATININE: CPT

## 2020-01-01 PROCEDURE — 86317 IMMUNOASSAY INFECTIOUS AGENT: CPT

## 2020-01-01 PROCEDURE — 99285 EMERGENCY DEPT VISIT HI MDM: CPT

## 2020-01-01 PROCEDURE — 94770 HC ETCO2 MONITOR DAILY: CPT

## 2020-01-01 PROCEDURE — 82570 ASSAY OF URINE CREATININE: CPT

## 2020-01-01 PROCEDURE — 82150 ASSAY OF AMYLASE: CPT

## 2020-01-01 PROCEDURE — 85379 FIBRIN DEGRADATION QUANT: CPT

## 2020-01-01 PROCEDURE — 36600 WITHDRAWAL OF ARTERIAL BLOOD: CPT

## 2020-01-01 PROCEDURE — 74018 RADEX ABDOMEN 1 VIEW: CPT

## 2020-01-01 PROCEDURE — 36415 COLL VENOUS BLD VENIPUNCTURE: CPT

## 2020-01-01 PROCEDURE — 36430 TRANSFUSION BLD/BLD COMPNT: CPT

## 2020-01-01 PROCEDURE — 84300 ASSAY OF URINE SODIUM: CPT

## 2020-01-01 PROCEDURE — 82010 KETONE BODYS QUAN: CPT

## 2020-01-01 PROCEDURE — 82947 ASSAY GLUCOSE BLOOD QUANT: CPT

## 2020-01-01 PROCEDURE — P9017 PLASMA 1 DONOR FRZ W/IN 8 HR: HCPCS

## 2020-01-01 PROCEDURE — 83036 HEMOGLOBIN GLYCOSYLATED A1C: CPT

## 2020-01-01 PROCEDURE — 80074 ACUTE HEPATITIS PANEL: CPT

## 2020-01-01 PROCEDURE — 6360000002 HC RX W HCPCS: Performed by: NEUROLOGICAL SURGERY

## 2020-01-01 PROCEDURE — 99233 SBSQ HOSP IP/OBS HIGH 50: CPT | Performed by: NEUROLOGICAL SURGERY

## 2020-01-01 PROCEDURE — 80307 DRUG TEST PRSMV CHEM ANLYZR: CPT

## 2020-01-01 PROCEDURE — 84155 ASSAY OF PROTEIN SERUM: CPT

## 2020-01-01 PROCEDURE — 83930 ASSAY OF BLOOD OSMOLALITY: CPT

## 2020-01-01 PROCEDURE — 5A1D70Z PERFORMANCE OF URINARY FILTRATION, INTERMITTENT, LESS THAN 6 HOURS PER DAY: ICD-10-PCS | Performed by: INTERNAL MEDICINE

## 2020-01-01 PROCEDURE — 83883 ASSAY NEPHELOMETRY NOT SPEC: CPT

## 2020-01-01 PROCEDURE — 84295 ASSAY OF SERUM SODIUM: CPT

## 2020-01-01 PROCEDURE — 76705 ECHO EXAM OF ABDOMEN: CPT

## 2020-01-01 PROCEDURE — APPNB45 APP NON BILLABLE 31-45 MINUTES: Performed by: INTERNAL MEDICINE

## 2020-01-01 PROCEDURE — 86901 BLOOD TYPING SEROLOGIC RH(D): CPT

## 2020-01-01 PROCEDURE — 86038 ANTINUCLEAR ANTIBODIES: CPT

## 2020-01-01 PROCEDURE — 009600Z DRAINAGE OF CEREBRAL VENTRICLE WITH DRAINAGE DEVICE, OPEN APPROACH: ICD-10-PCS | Performed by: NEUROLOGICAL SURGERY

## 2020-01-01 PROCEDURE — P9037 PLATE PHERES LEUKOREDU IRRAD: HCPCS

## 2020-01-01 PROCEDURE — 2580000003 HC RX 258: Performed by: EMERGENCY MEDICINE

## 2020-01-01 PROCEDURE — 86850 RBC ANTIBODY SCREEN: CPT

## 2020-01-01 PROCEDURE — 4A133J1 MONITORING OF ARTERIAL PULSE, PERIPHERAL, PERCUTANEOUS APPROACH: ICD-10-PCS | Performed by: STUDENT IN AN ORGANIZED HEALTH CARE EDUCATION/TRAINING PROGRAM

## 2020-01-01 PROCEDURE — 02HV33Z INSERTION OF INFUSION DEVICE INTO SUPERIOR VENA CAVA, PERCUTANEOUS APPROACH: ICD-10-PCS | Performed by: STUDENT IN AN ORGANIZED HEALTH CARE EDUCATION/TRAINING PROGRAM

## 2020-01-01 PROCEDURE — 86160 COMPLEMENT ANTIGEN: CPT

## 2020-01-01 PROCEDURE — 84484 ASSAY OF TROPONIN QUANT: CPT

## 2020-01-01 PROCEDURE — 82105 ALPHA-FETOPROTEIN SERUM: CPT

## 2020-01-01 PROCEDURE — 4A133B1 MONITORING OF ARTERIAL PRESSURE, PERIPHERAL, PERCUTANEOUS APPROACH: ICD-10-PCS | Performed by: STUDENT IN AN ORGANIZED HEALTH CARE EDUCATION/TRAINING PROGRAM

## 2020-01-01 PROCEDURE — 81001 URINALYSIS AUTO W/SCOPE: CPT

## 2020-01-01 PROCEDURE — 99221 1ST HOSP IP/OBS SF/LOW 40: CPT | Performed by: INTERNAL MEDICINE

## 2020-01-01 PROCEDURE — 31500 INSERT EMERGENCY AIRWAY: CPT | Performed by: INTERNAL MEDICINE

## 2020-01-01 PROCEDURE — 87641 MR-STAPH DNA AMP PROBE: CPT

## 2020-01-01 PROCEDURE — 84100 ASSAY OF PHOSPHORUS: CPT

## 2020-01-01 PROCEDURE — 87070 CULTURE OTHR SPECIMN AEROBIC: CPT

## 2020-01-01 PROCEDURE — 2500000003 HC RX 250 WO HCPCS

## 2020-01-01 PROCEDURE — 99222 1ST HOSP IP/OBS MODERATE 55: CPT | Performed by: FAMILY MEDICINE

## 2020-01-01 PROCEDURE — 84165 PROTEIN E-PHORESIS SERUM: CPT

## 2020-01-01 PROCEDURE — 86920 COMPATIBILITY TEST SPIN: CPT

## 2020-01-01 PROCEDURE — 61107 TDH PNXR IMPLT VENTR CATH: CPT | Performed by: NEUROLOGICAL SURGERY

## 2020-01-01 RX ORDER — POTASSIUM CHLORIDE 7.45 MG/ML
10 INJECTION INTRAVENOUS PRN
Status: DISCONTINUED | OUTPATIENT
Start: 2020-01-01 | End: 2020-01-01

## 2020-01-01 RX ORDER — LORAZEPAM 2 MG/1
2 TABLET ORAL
Status: DISCONTINUED | OUTPATIENT
Start: 2020-01-01 | End: 2020-01-01

## 2020-01-01 RX ORDER — 0.9 % SODIUM CHLORIDE 0.9 %
20 INTRAVENOUS SOLUTION INTRAVENOUS ONCE
Status: COMPLETED | OUTPATIENT
Start: 2020-01-01 | End: 2020-01-01

## 2020-01-01 RX ORDER — POTASSIUM CHLORIDE 29.8 MG/ML
20 INJECTION INTRAVENOUS
Status: DISPENSED | OUTPATIENT
Start: 2020-01-01 | End: 2020-01-01

## 2020-01-01 RX ORDER — PROMETHAZINE HYDROCHLORIDE 25 MG/1
12.5 TABLET ORAL EVERY 6 HOURS PRN
Status: DISCONTINUED | OUTPATIENT
Start: 2020-01-01 | End: 2020-04-22 | Stop reason: HOSPADM

## 2020-01-01 RX ORDER — PHYTONADIONE 10 MG/ML
10 INJECTION, EMULSION INTRAMUSCULAR; INTRAVENOUS; SUBCUTANEOUS DAILY
Status: DISCONTINUED | OUTPATIENT
Start: 2020-01-01 | End: 2020-01-01

## 2020-01-01 RX ORDER — MAGNESIUM SULFATE 1 G/100ML
1 INJECTION INTRAVENOUS
Status: COMPLETED | OUTPATIENT
Start: 2020-01-01 | End: 2020-01-01

## 2020-01-01 RX ORDER — PENTOXIFYLLINE 400 MG/1
400 TABLET, EXTENDED RELEASE ORAL 2 TIMES DAILY WITH MEALS
Status: DISCONTINUED | OUTPATIENT
Start: 2020-01-01 | End: 2020-01-01

## 2020-01-01 RX ORDER — LORAZEPAM 2 MG/ML
0.5 INJECTION INTRAMUSCULAR EVERY 30 MIN PRN
Status: DISCONTINUED | OUTPATIENT
Start: 2020-01-01 | End: 2020-04-22 | Stop reason: HOSPADM

## 2020-01-01 RX ORDER — LORAZEPAM 2 MG/ML
1 INJECTION INTRAMUSCULAR EVERY 30 MIN PRN
Status: DISCONTINUED | OUTPATIENT
Start: 2020-01-01 | End: 2020-04-22 | Stop reason: HOSPADM

## 2020-01-01 RX ORDER — ONDANSETRON 2 MG/ML
4 INJECTION INTRAMUSCULAR; INTRAVENOUS EVERY 6 HOURS PRN
Status: DISCONTINUED | OUTPATIENT
Start: 2020-01-01 | End: 2020-04-22 | Stop reason: HOSPADM

## 2020-01-01 RX ORDER — 0.9 % SODIUM CHLORIDE 0.9 %
500 INTRAVENOUS SOLUTION INTRAVENOUS ONCE
Status: COMPLETED | OUTPATIENT
Start: 2020-01-01 | End: 2020-01-01

## 2020-01-01 RX ORDER — PROPOFOL 10 MG/ML
10 INJECTION, EMULSION INTRAVENOUS
Status: DISCONTINUED | OUTPATIENT
Start: 2020-01-01 | End: 2020-01-01

## 2020-01-01 RX ORDER — LEVETIRACETAM 5 MG/ML
500 INJECTION INTRAVASCULAR DAILY
Status: DISCONTINUED | OUTPATIENT
Start: 2020-01-01 | End: 2020-01-01

## 2020-01-01 RX ORDER — SODIUM CHLORIDE 0.9 % (FLUSH) 0.9 %
10 SYRINGE (ML) INJECTION PRN
Status: DISCONTINUED | OUTPATIENT
Start: 2020-01-01 | End: 2020-04-22 | Stop reason: HOSPADM

## 2020-01-01 RX ORDER — SODIUM CHLORIDE 9 MG/ML
10 INJECTION INTRAVENOUS 2 TIMES DAILY
Status: DISCONTINUED | OUTPATIENT
Start: 2020-01-01 | End: 2020-01-01

## 2020-01-01 RX ORDER — MORPHINE SULFATE 2 MG/ML
2 INJECTION, SOLUTION INTRAMUSCULAR; INTRAVENOUS EVERY 30 MIN PRN
Status: DISCONTINUED | OUTPATIENT
Start: 2020-01-01 | End: 2020-04-22 | Stop reason: HOSPADM

## 2020-01-01 RX ORDER — LORAZEPAM 2 MG/ML
3 INJECTION INTRAMUSCULAR
Status: DISCONTINUED | OUTPATIENT
Start: 2020-01-01 | End: 2020-01-01

## 2020-01-01 RX ORDER — PROPOFOL 10 MG/ML
INJECTION, EMULSION INTRAVENOUS
Status: COMPLETED
Start: 2020-01-01 | End: 2020-01-01

## 2020-01-01 RX ORDER — 0.9 % SODIUM CHLORIDE 0.9 %
150 INTRAVENOUS SOLUTION INTRAVENOUS PRN
Status: DISCONTINUED | OUTPATIENT
Start: 2020-01-01 | End: 2020-04-22 | Stop reason: HOSPADM

## 2020-01-01 RX ORDER — POTASSIUM CHLORIDE 29.8 MG/ML
20 INJECTION INTRAVENOUS ONCE
Status: COMPLETED | OUTPATIENT
Start: 2020-01-01 | End: 2020-01-01

## 2020-01-01 RX ORDER — LORAZEPAM 1 MG/1
1 TABLET ORAL
Status: DISCONTINUED | OUTPATIENT
Start: 2020-01-01 | End: 2020-01-01

## 2020-01-01 RX ORDER — LORAZEPAM 2 MG/ML
2 INJECTION INTRAMUSCULAR
Status: DISCONTINUED | OUTPATIENT
Start: 2020-01-01 | End: 2020-01-01

## 2020-01-01 RX ORDER — PANTOPRAZOLE SODIUM 40 MG/10ML
40 INJECTION, POWDER, LYOPHILIZED, FOR SOLUTION INTRAVENOUS DAILY
Status: DISCONTINUED | OUTPATIENT
Start: 2020-01-01 | End: 2020-01-01

## 2020-01-01 RX ORDER — PANTOPRAZOLE SODIUM 40 MG/1
40 TABLET, DELAYED RELEASE ORAL
Status: DISCONTINUED | OUTPATIENT
Start: 2020-01-01 | End: 2020-01-01

## 2020-01-01 RX ORDER — LORAZEPAM 1 MG/1
1 TABLET ORAL PRN
Status: DISCONTINUED | OUTPATIENT
Start: 2020-01-01 | End: 2020-01-01

## 2020-01-01 RX ORDER — SODIUM CHLORIDE 0.9 % (FLUSH) 0.9 %
10 SYRINGE (ML) INJECTION EVERY 12 HOURS SCHEDULED
Status: DISCONTINUED | OUTPATIENT
Start: 2020-01-01 | End: 2020-01-01

## 2020-01-01 RX ORDER — FENTANYL CITRATE 50 UG/ML
50 INJECTION, SOLUTION INTRAMUSCULAR; INTRAVENOUS
Status: DISCONTINUED | OUTPATIENT
Start: 2020-01-01 | End: 2020-04-22 | Stop reason: HOSPADM

## 2020-01-01 RX ORDER — POTASSIUM CHLORIDE 7.45 MG/ML
INJECTION INTRAVENOUS
Status: DISPENSED
Start: 2020-01-01 | End: 2020-01-01

## 2020-01-01 RX ORDER — LORAZEPAM 2 MG/ML
4 INJECTION INTRAMUSCULAR
Status: DISCONTINUED | OUTPATIENT
Start: 2020-01-01 | End: 2020-01-01

## 2020-01-01 RX ORDER — SODIUM CHLORIDE 9 MG/ML
INJECTION, SOLUTION INTRAVENOUS CONTINUOUS
Status: DISCONTINUED | OUTPATIENT
Start: 2020-01-01 | End: 2020-01-01

## 2020-01-01 RX ORDER — GLYCOPYRROLATE 0.2 MG/ML
0.2 INJECTION INTRAMUSCULAR; INTRAVENOUS EVERY 4 HOURS PRN
Status: DISCONTINUED | OUTPATIENT
Start: 2020-01-01 | End: 2020-04-22 | Stop reason: HOSPADM

## 2020-01-01 RX ORDER — PROPOFOL 10 MG/ML
10 INJECTION, EMULSION INTRAVENOUS ONCE
Status: COMPLETED | OUTPATIENT
Start: 2020-01-01 | End: 2020-01-01

## 2020-01-01 RX ORDER — ETOMIDATE 2 MG/ML
20 INJECTION INTRAVENOUS ONCE
Status: COMPLETED | OUTPATIENT
Start: 2020-01-01 | End: 2020-01-01

## 2020-01-01 RX ORDER — LORAZEPAM 2 MG/ML
1 INJECTION INTRAMUSCULAR
Status: DISCONTINUED | OUTPATIENT
Start: 2020-01-01 | End: 2020-01-01

## 2020-01-01 RX ORDER — LACTULOSE 10 G/15ML
20 SOLUTION ORAL 3 TIMES DAILY
Status: DISCONTINUED | OUTPATIENT
Start: 2020-01-01 | End: 2020-01-01

## 2020-01-01 RX ORDER — PENTOXIFYLLINE 400 MG/1
400 TABLET, EXTENDED RELEASE ORAL
Status: DISCONTINUED | OUTPATIENT
Start: 2020-01-01 | End: 2020-01-01

## 2020-01-01 RX ORDER — POLYETHYLENE GLYCOL 3350 17 G/17G
17 POWDER, FOR SOLUTION ORAL DAILY PRN
Status: DISCONTINUED | OUTPATIENT
Start: 2020-01-01 | End: 2020-04-22 | Stop reason: HOSPADM

## 2020-01-01 RX ORDER — SODIUM CHLORIDE 9 MG/ML
INJECTION, SOLUTION INTRAVENOUS CONTINUOUS
Status: DISCONTINUED | OUTPATIENT
Start: 2020-01-01 | End: 2020-04-22 | Stop reason: HOSPADM

## 2020-01-01 RX ORDER — DEXTROSE MONOHYDRATE 50 MG/ML
100 INJECTION, SOLUTION INTRAVENOUS PRN
Status: DISCONTINUED | OUTPATIENT
Start: 2020-01-01 | End: 2020-04-22 | Stop reason: HOSPADM

## 2020-01-01 RX ORDER — DEXTROSE MONOHYDRATE 25 G/50ML
12.5 INJECTION, SOLUTION INTRAVENOUS PRN
Status: DISCONTINUED | OUTPATIENT
Start: 2020-01-01 | End: 2020-04-22 | Stop reason: HOSPADM

## 2020-01-01 RX ORDER — SODIUM CHLORIDE 9 MG/ML
10 INJECTION INTRAVENOUS DAILY
Status: DISCONTINUED | OUTPATIENT
Start: 2020-01-01 | End: 2020-01-01

## 2020-01-01 RX ORDER — LORAZEPAM 2 MG/1
4 TABLET ORAL
Status: DISCONTINUED | OUTPATIENT
Start: 2020-01-01 | End: 2020-01-01

## 2020-01-01 RX ORDER — HEPARIN SODIUM 1000 [USP'U]/ML
1400 INJECTION, SOLUTION INTRAVENOUS; SUBCUTANEOUS ONCE
Status: COMPLETED | OUTPATIENT
Start: 2020-01-01 | End: 2020-01-01

## 2020-01-01 RX ORDER — LACTULOSE 10 G/15ML
20 SOLUTION ORAL ONCE
Status: COMPLETED | OUTPATIENT
Start: 2020-01-01 | End: 2020-01-01

## 2020-01-01 RX ORDER — PANTOPRAZOLE SODIUM 40 MG/10ML
40 INJECTION, POWDER, LYOPHILIZED, FOR SOLUTION INTRAVENOUS 2 TIMES DAILY
Status: DISCONTINUED | OUTPATIENT
Start: 2020-01-01 | End: 2020-01-01

## 2020-01-01 RX ORDER — 3% SODIUM CHLORIDE 3 G/100ML
250 INJECTION, SOLUTION INTRAVENOUS ONCE
Status: COMPLETED | OUTPATIENT
Start: 2020-01-01 | End: 2020-01-01

## 2020-01-01 RX ORDER — MORPHINE SULFATE 4 MG/ML
4 INJECTION, SOLUTION INTRAMUSCULAR; INTRAVENOUS EVERY 30 MIN PRN
Status: DISCONTINUED | OUTPATIENT
Start: 2020-01-01 | End: 2020-04-22 | Stop reason: HOSPADM

## 2020-01-01 RX ORDER — POTASSIUM CHLORIDE 7.45 MG/ML
10 INJECTION INTRAVENOUS
Status: DISPENSED | OUTPATIENT
Start: 2020-01-01 | End: 2020-01-01

## 2020-01-01 RX ORDER — MIDODRINE HYDROCHLORIDE 5 MG/1
5 TABLET ORAL 3 TIMES DAILY
Status: DISCONTINUED | OUTPATIENT
Start: 2020-01-01 | End: 2020-01-01

## 2020-01-01 RX ORDER — FOLIC ACID 5 MG/ML
1 INJECTION, SOLUTION INTRAMUSCULAR; INTRAVENOUS; SUBCUTANEOUS DAILY
Status: DISCONTINUED | OUTPATIENT
Start: 2020-01-01 | End: 2020-01-01

## 2020-01-01 RX ORDER — NICOTINE POLACRILEX 4 MG
15 LOZENGE BUCCAL PRN
Status: DISCONTINUED | OUTPATIENT
Start: 2020-01-01 | End: 2020-04-22 | Stop reason: HOSPADM

## 2020-01-01 RX ORDER — POTASSIUM CHLORIDE 29.8 MG/ML
20 INJECTION INTRAVENOUS PRN
Status: DISCONTINUED | OUTPATIENT
Start: 2020-01-01 | End: 2020-01-01

## 2020-01-01 RX ORDER — 0.9 % SODIUM CHLORIDE 0.9 %
250 INTRAVENOUS SOLUTION INTRAVENOUS PRN
Status: DISCONTINUED | OUTPATIENT
Start: 2020-01-01 | End: 2020-04-22 | Stop reason: HOSPADM

## 2020-01-01 RX ORDER — SODIUM CHLORIDE 234 MG/ML
30 INJECTION, SOLUTION INTRAVENOUS ONCE
Status: COMPLETED | OUTPATIENT
Start: 2020-01-01 | End: 2020-01-01

## 2020-01-01 RX ORDER — POTASSIUM CHLORIDE 29.8 MG/ML
60 INJECTION INTRAVENOUS ONCE
Status: COMPLETED | OUTPATIENT
Start: 2020-01-01 | End: 2020-01-01

## 2020-01-01 RX ORDER — LACTULOSE 10 G/15ML
20 SOLUTION ORAL EVERY 6 HOURS SCHEDULED
Status: DISCONTINUED | OUTPATIENT
Start: 2020-01-01 | End: 2020-01-01

## 2020-01-01 RX ORDER — HEPARIN SODIUM 1000 [USP'U]/ML
1400 INJECTION, SOLUTION INTRAVENOUS; SUBCUTANEOUS PRN
Status: DISCONTINUED | OUTPATIENT
Start: 2020-01-01 | End: 2020-04-22 | Stop reason: HOSPADM

## 2020-01-01 RX ORDER — ALBUMIN (HUMAN) 12.5 G/50ML
12.5 SOLUTION INTRAVENOUS EVERY 8 HOURS
Status: COMPLETED | OUTPATIENT
Start: 2020-01-01 | End: 2020-01-01

## 2020-01-01 RX ORDER — MULTIVITAMIN WITH FOLIC ACID 400 MCG
1 TABLET ORAL DAILY
Status: DISCONTINUED | OUTPATIENT
Start: 2020-01-01 | End: 2020-01-01

## 2020-01-01 RX ORDER — HEPARIN SODIUM 1000 [USP'U]/ML
1300 INJECTION, SOLUTION INTRAVENOUS; SUBCUTANEOUS PRN
Status: DISCONTINUED | OUTPATIENT
Start: 2020-01-01 | End: 2020-04-22 | Stop reason: HOSPADM

## 2020-01-01 RX ORDER — HEPARIN SODIUM 1000 [USP'U]/ML
1300 INJECTION, SOLUTION INTRAVENOUS; SUBCUTANEOUS ONCE
Status: COMPLETED | OUTPATIENT
Start: 2020-01-01 | End: 2020-01-01

## 2020-01-01 RX ADMIN — OCTREOTIDE ACETATE 50 MCG/HR: 500 INJECTION, SOLUTION INTRAVENOUS; SUBCUTANEOUS at 04:26

## 2020-01-01 RX ADMIN — LACTULOSE 20 G: 20 SOLUTION ORAL at 06:45

## 2020-01-01 RX ADMIN — SODIUM CHLORIDE, PRESERVATIVE FREE 10 ML: 5 INJECTION INTRAVENOUS at 20:10

## 2020-01-01 RX ADMIN — LEVETIRACETAM 500 MG: 5 INJECTION INTRAVENOUS at 17:43

## 2020-01-01 RX ADMIN — PENTOXIFYLLINE: 400 TABLET, FILM COATED, EXTENDED RELEASE ORAL at 12:36

## 2020-01-01 RX ADMIN — MIDODRINE HYDROCHLORIDE 5 MG: 5 TABLET ORAL at 14:03

## 2020-01-01 RX ADMIN — LEVETIRACETAM 500 MG: 5 INJECTION INTRAVENOUS at 17:01

## 2020-01-01 RX ADMIN — LEVETIRACETAM 500 MG: 5 INJECTION INTRAVENOUS at 17:30

## 2020-01-01 RX ADMIN — MIDODRINE HYDROCHLORIDE 5 MG: 5 TABLET ORAL at 20:10

## 2020-01-01 RX ADMIN — SODIUM CHLORIDE: 9 INJECTION, SOLUTION INTRAVENOUS at 11:45

## 2020-01-01 RX ADMIN — SODIUM CHLORIDE 8 MG/HR: 9 INJECTION, SOLUTION INTRAVENOUS at 14:31

## 2020-01-01 RX ADMIN — HEPARIN SODIUM 1300 UNITS: 1000 INJECTION, SOLUTION INTRAVENOUS; SUBCUTANEOUS at 12:30

## 2020-01-01 RX ADMIN — PHYTONADIONE 10 MG: 10 INJECTION, EMULSION INTRAMUSCULAR; INTRAVENOUS; SUBCUTANEOUS at 21:56

## 2020-01-01 RX ADMIN — MIDODRINE HYDROCHLORIDE 5 MG: 5 TABLET ORAL at 12:35

## 2020-01-01 RX ADMIN — LACTULOSE 20 G: 20 SOLUTION ORAL at 14:12

## 2020-01-01 RX ADMIN — POTASSIUM BICARBONATE 40 MEQ: 782 TABLET, EFFERVESCENT ORAL at 14:34

## 2020-01-01 RX ADMIN — RIFAXIMIN 550 MG: 550 TABLET ORAL at 17:18

## 2020-01-01 RX ADMIN — PIPERACILLIN SODIUM AND TAZOBACTAM SODIUM 2.25 G: 2; .25 INJECTION, POWDER, LYOPHILIZED, FOR SOLUTION INTRAVENOUS at 20:33

## 2020-01-01 RX ADMIN — PROPOFOL INJECTABLE EMULSION 30 MCG/KG/MIN: 10 INJECTION, EMULSION INTRAVENOUS at 20:53

## 2020-01-01 RX ADMIN — ALBUMIN (HUMAN) 12.5 G: 0.25 INJECTION, SOLUTION INTRAVENOUS at 21:56

## 2020-01-01 RX ADMIN — SODIUM CHLORIDE 150 ML: 3 INJECTION, SOLUTION INTRAVENOUS at 01:59

## 2020-01-01 RX ADMIN — MIDODRINE HYDROCHLORIDE 5 MG: 5 TABLET ORAL at 08:57

## 2020-01-01 RX ADMIN — SODIUM CHLORIDE 8 MG/HR: 9 INJECTION, SOLUTION INTRAVENOUS at 01:24

## 2020-01-01 RX ADMIN — GLYCOPYRROLATE 0.2 MG: 0.2 INJECTION INTRAMUSCULAR; INTRAVENOUS at 17:00

## 2020-01-01 RX ADMIN — SODIUM CHLORIDE 20 ML: 0.9 INJECTION, SOLUTION INTRAVENOUS at 17:42

## 2020-01-01 RX ADMIN — SODIUM CHLORIDE, PRESERVATIVE FREE 10 ML: 5 INJECTION INTRAVENOUS at 09:00

## 2020-01-01 RX ADMIN — ALBUMIN (HUMAN) 12.5 G: 0.25 INJECTION, SOLUTION INTRAVENOUS at 04:50

## 2020-01-01 RX ADMIN — SODIUM CHLORIDE, PRESERVATIVE FREE 10 ML: 5 INJECTION INTRAVENOUS at 09:02

## 2020-01-01 RX ADMIN — LACTULOSE 20 G: 20 SOLUTION ORAL at 09:19

## 2020-01-01 RX ADMIN — HEPARIN SODIUM 1300 UNITS: 1000 INJECTION, SOLUTION INTRAVENOUS; SUBCUTANEOUS at 20:45

## 2020-01-01 RX ADMIN — OCTREOTIDE ACETATE 50 MCG/HR: 500 INJECTION, SOLUTION INTRAVENOUS; SUBCUTANEOUS at 01:24

## 2020-01-01 RX ADMIN — LORAZEPAM 1 MG: 2 INJECTION INTRAMUSCULAR; INTRAVENOUS at 17:00

## 2020-01-01 RX ADMIN — LACTULOSE 20 G: 20 SOLUTION ORAL at 20:33

## 2020-01-01 RX ADMIN — SODIUM CHLORIDE, PRESERVATIVE FREE 10 ML: 5 INJECTION INTRAVENOUS at 10:00

## 2020-01-01 RX ADMIN — Medication 10 ML: at 11:09

## 2020-01-01 RX ADMIN — SODIUM BICARBONATE: 84 INJECTION, SOLUTION INTRAVENOUS at 22:42

## 2020-01-01 RX ADMIN — ALBUMIN (HUMAN) 12.5 G: 0.25 INJECTION, SOLUTION INTRAVENOUS at 12:22

## 2020-01-01 RX ADMIN — HEPARIN SODIUM 1400 UNITS: 1000 INJECTION, SOLUTION INTRAVENOUS; SUBCUTANEOUS at 12:30

## 2020-01-01 RX ADMIN — HEPARIN SODIUM 1300 UNITS: 1000 INJECTION, SOLUTION INTRAVENOUS; SUBCUTANEOUS at 18:50

## 2020-01-01 RX ADMIN — POTASSIUM CHLORIDE 20 MEQ: 29.8 INJECTION, SOLUTION INTRAVENOUS at 10:12

## 2020-01-01 RX ADMIN — TRANEXAMIC ACID 385 MG: 100 INJECTION, SOLUTION INTRAVENOUS at 14:12

## 2020-01-01 RX ADMIN — LORAZEPAM 4 MG: 2 TABLET ORAL at 04:40

## 2020-01-01 RX ADMIN — SODIUM BICARBONATE: 84 INJECTION, SOLUTION INTRAVENOUS at 01:26

## 2020-01-01 RX ADMIN — LACTULOSE 20 G: 20 SOLUTION ORAL at 06:10

## 2020-01-01 RX ADMIN — OCTREOTIDE ACETATE 50 MCG/HR: 500 INJECTION, SOLUTION INTRAVENOUS; SUBCUTANEOUS at 16:01

## 2020-01-01 RX ADMIN — Medication 10 ML: at 08:18

## 2020-01-01 RX ADMIN — POTASSIUM CHLORIDE 20 MEQ: 29.8 INJECTION, SOLUTION INTRAVENOUS at 15:40

## 2020-01-01 RX ADMIN — FOLIC ACID: 5 INJECTION, SOLUTION INTRAMUSCULAR; INTRAVENOUS; SUBCUTANEOUS at 23:00

## 2020-01-01 RX ADMIN — SODIUM CHLORIDE, PRESERVATIVE FREE 10 ML: 5 INJECTION INTRAVENOUS at 21:12

## 2020-01-01 RX ADMIN — RIFAXIMIN 550 MG: 550 TABLET ORAL at 09:19

## 2020-01-01 RX ADMIN — ETOMIDATE 20 MG: 20 INJECTION, SOLUTION INTRAVENOUS at 13:47

## 2020-01-01 RX ADMIN — SODIUM BICARBONATE: 84 INJECTION, SOLUTION INTRAVENOUS at 07:32

## 2020-01-01 RX ADMIN — POTASSIUM CHLORIDE 20 MEQ: 29.8 INJECTION, SOLUTION INTRAVENOUS at 08:45

## 2020-01-01 RX ADMIN — HEPARIN SODIUM 1400 UNITS: 1000 INJECTION, SOLUTION INTRAVENOUS; SUBCUTANEOUS at 20:45

## 2020-01-01 RX ADMIN — PIPERACILLIN SODIUM AND TAZOBACTAM SODIUM: 2; .25 INJECTION, POWDER, LYOPHILIZED, FOR SOLUTION INTRAVENOUS at 12:10

## 2020-01-01 RX ADMIN — SODIUM CHLORIDE 20 ML: 0.9 INJECTION, SOLUTION INTRAVENOUS at 17:44

## 2020-01-01 RX ADMIN — FENTANYL CITRATE 50 MCG: 50 INJECTION, SOLUTION INTRAMUSCULAR; INTRAVENOUS at 20:54

## 2020-01-01 RX ADMIN — POTASSIUM CHLORIDE 60 MEQ: 29.8 INJECTION, SOLUTION INTRAVENOUS at 01:38

## 2020-01-01 RX ADMIN — SODIUM CHLORIDE, PRESERVATIVE FREE 10 ML: 5 INJECTION INTRAVENOUS at 21:13

## 2020-01-01 RX ADMIN — SODIUM CHLORIDE 8 MG/HR: 9 INJECTION, SOLUTION INTRAVENOUS at 01:26

## 2020-01-01 RX ADMIN — TRANEXAMIC ACID 385 MG: 100 INJECTION, SOLUTION INTRAVENOUS at 15:11

## 2020-01-01 RX ADMIN — SODIUM BICARBONATE: 84 INJECTION, SOLUTION INTRAVENOUS at 11:38

## 2020-01-01 RX ADMIN — PENTOXIFYLLINE: 400 TABLET, FILM COATED, EXTENDED RELEASE ORAL at 17:20

## 2020-01-01 RX ADMIN — INSULIN LISPRO 1 UNITS: 100 INJECTION, SOLUTION INTRAVENOUS; SUBCUTANEOUS at 11:55

## 2020-01-01 RX ADMIN — RIFAXIMIN 550 MG: 550 TABLET ORAL at 21:11

## 2020-01-01 RX ADMIN — PHYTONADIONE 10 MG: 10 INJECTION, EMULSION INTRAMUSCULAR; INTRAVENOUS; SUBCUTANEOUS at 10:12

## 2020-01-01 RX ADMIN — FOLIC ACID: 5 INJECTION, SOLUTION INTRAMUSCULAR; INTRAVENOUS; SUBCUTANEOUS at 21:56

## 2020-01-01 RX ADMIN — ALBUMIN (HUMAN) 12.5 G: 0.25 INJECTION, SOLUTION INTRAVENOUS at 04:26

## 2020-01-01 RX ADMIN — Medication 10 ML: at 09:20

## 2020-01-01 RX ADMIN — LACTULOSE 20 G: 20 SOLUTION ORAL at 14:39

## 2020-01-01 RX ADMIN — PIPERACILLIN SODIUM AND TAZOBACTAM SODIUM: 2; .25 INJECTION, POWDER, LYOPHILIZED, FOR SOLUTION INTRAVENOUS at 03:16

## 2020-01-01 RX ADMIN — CEFTRIAXONE SODIUM 2 G: 2 INJECTION, POWDER, FOR SOLUTION INTRAMUSCULAR; INTRAVENOUS at 00:30

## 2020-01-01 RX ADMIN — SODIUM CHLORIDE 20 ML: 0.9 INJECTION, SOLUTION INTRAVENOUS at 06:15

## 2020-01-01 RX ADMIN — SODIUM CHLORIDE 8 MG/HR: 9 INJECTION, SOLUTION INTRAVENOUS at 19:45

## 2020-01-01 RX ADMIN — Medication: at 02:00

## 2020-01-01 RX ADMIN — CEFTRIAXONE SODIUM 2 G: 2 INJECTION, POWDER, FOR SOLUTION INTRAMUSCULAR; INTRAVENOUS at 00:20

## 2020-01-01 RX ADMIN — SODIUM CHLORIDE 500 ML: 0.9 INJECTION, SOLUTION INTRAVENOUS at 18:00

## 2020-01-01 RX ADMIN — POTASSIUM CHLORIDE 20 MEQ: 29.8 INJECTION, SOLUTION INTRAVENOUS at 11:18

## 2020-01-01 RX ADMIN — TRANEXAMIC ACID 385 MG: 100 INJECTION, SOLUTION INTRAVENOUS at 14:35

## 2020-01-01 RX ADMIN — SODIUM CHLORIDE, PRESERVATIVE FREE 10 ML: 5 INJECTION INTRAVENOUS at 09:20

## 2020-01-01 RX ADMIN — PHYTONADIONE 10 MG: 10 INJECTION, EMULSION INTRAMUSCULAR; INTRAVENOUS; SUBCUTANEOUS at 12:11

## 2020-01-01 RX ADMIN — SODIUM CHLORIDE, PRESERVATIVE FREE 10 ML: 5 INJECTION INTRAVENOUS at 20:34

## 2020-01-01 RX ADMIN — OCTREOTIDE ACETATE 50 MCG/HR: 500 INJECTION, SOLUTION INTRAVENOUS; SUBCUTANEOUS at 12:57

## 2020-01-01 RX ADMIN — PENTOXIFYLLINE: 400 TABLET, FILM COATED, EXTENDED RELEASE ORAL at 08:24

## 2020-01-01 RX ADMIN — Medication 10 ML: at 21:12

## 2020-01-01 RX ADMIN — LEVETIRACETAM 1000 MG: 100 INJECTION, SOLUTION INTRAVENOUS at 12:31

## 2020-01-01 RX ADMIN — FOLIC ACID: 5 INJECTION, SOLUTION INTRAMUSCULAR; INTRAVENOUS; SUBCUTANEOUS at 22:08

## 2020-01-01 RX ADMIN — PANTOPRAZOLE SODIUM 40 MG: 40 INJECTION, POWDER, FOR SOLUTION INTRAVENOUS at 11:09

## 2020-01-01 RX ADMIN — PANTOPRAZOLE SODIUM 40 MG: 40 INJECTION, POWDER, FOR SOLUTION INTRAVENOUS at 21:12

## 2020-01-01 RX ADMIN — LACTULOSE 20 G: 20 SOLUTION ORAL at 17:21

## 2020-01-01 RX ADMIN — OCTREOTIDE ACETATE 50 MCG/HR: 500 INJECTION, SOLUTION INTRAVENOUS; SUBCUTANEOUS at 14:41

## 2020-01-01 RX ADMIN — MIDODRINE HYDROCHLORIDE 5 MG: 5 TABLET ORAL at 21:11

## 2020-01-01 RX ADMIN — SODIUM CHLORIDE: 9 INJECTION, SOLUTION INTRAVENOUS at 19:14

## 2020-01-01 RX ADMIN — MAGNESIUM SULFATE HEPTAHYDRATE 1 G: 1 INJECTION, SOLUTION INTRAVENOUS at 10:51

## 2020-01-01 RX ADMIN — FOLIC ACID: 5 INJECTION, SOLUTION INTRAMUSCULAR; INTRAVENOUS; SUBCUTANEOUS at 21:11

## 2020-01-01 RX ADMIN — PROPOFOL INJECTABLE EMULSION 30 MCG/KG/MIN: 10 INJECTION, EMULSION INTRAVENOUS at 20:13

## 2020-01-01 RX ADMIN — SODIUM BICARBONATE: 84 INJECTION, SOLUTION INTRAVENOUS at 09:43

## 2020-01-01 RX ADMIN — SODIUM CHLORIDE 8 MG/HR: 9 INJECTION, SOLUTION INTRAVENOUS at 04:26

## 2020-01-01 RX ADMIN — DESMOPRESSIN ACETATE 11.56 MCG: 4 SOLUTION INTRAVENOUS at 14:42

## 2020-01-01 RX ADMIN — MIDODRINE HYDROCHLORIDE 5 MG: 5 TABLET ORAL at 14:12

## 2020-01-01 RX ADMIN — HEPARIN SODIUM 1300 UNITS: 1000 INJECTION INTRAVENOUS; SUBCUTANEOUS at 15:45

## 2020-01-01 RX ADMIN — LACTULOSE 20 G: 20 SOLUTION ORAL at 21:11

## 2020-01-01 RX ADMIN — SODIUM CHLORIDE, PRESERVATIVE FREE 10 ML: 5 INJECTION INTRAVENOUS at 08:25

## 2020-01-01 RX ADMIN — LACTULOSE 20 G: 20 SOLUTION ORAL at 08:57

## 2020-01-01 RX ADMIN — FOLIC ACID: 5 INJECTION, SOLUTION INTRAMUSCULAR; INTRAVENOUS; SUBCUTANEOUS at 22:42

## 2020-01-01 RX ADMIN — PIPERACILLIN SODIUM AND TAZOBACTAM SODIUM 2.25 G: 2; .25 INJECTION, POWDER, LYOPHILIZED, FOR SOLUTION INTRAVENOUS at 20:23

## 2020-01-01 RX ADMIN — PIPERACILLIN SODIUM AND TAZOBACTAM SODIUM 2.25 G: 2; .25 INJECTION, POWDER, LYOPHILIZED, FOR SOLUTION INTRAVENOUS at 04:50

## 2020-01-01 RX ADMIN — SODIUM CHLORIDE: 9 INJECTION, SOLUTION INTRAVENOUS at 23:16

## 2020-01-01 RX ADMIN — MIDODRINE HYDROCHLORIDE 5 MG: 5 TABLET ORAL at 20:33

## 2020-01-01 RX ADMIN — OCTREOTIDE ACETATE 50 MCG/HR: 500 INJECTION, SOLUTION INTRAVENOUS; SUBCUTANEOUS at 19:10

## 2020-01-01 RX ADMIN — PIPERACILLIN SODIUM AND TAZOBACTAM SODIUM 2.25 G: 2; .25 INJECTION, POWDER, LYOPHILIZED, FOR SOLUTION INTRAVENOUS at 11:52

## 2020-01-01 RX ADMIN — SODIUM CHLORIDE 8 MG/HR: 9 INJECTION, SOLUTION INTRAVENOUS at 14:40

## 2020-01-01 RX ADMIN — LACTULOSE 20 G: 20 SOLUTION ORAL at 23:55

## 2020-01-01 RX ADMIN — RIFAXIMIN 550 MG: 550 TABLET ORAL at 20:33

## 2020-01-01 RX ADMIN — MORPHINE SULFATE 4 MG: 4 INJECTION INTRAVENOUS at 16:59

## 2020-01-01 RX ADMIN — SODIUM CHLORIDE 120 MEQ: 234 INJECTION INTRAMUSCULAR; INTRAVENOUS; SUBCUTANEOUS at 08:38

## 2020-01-01 RX ADMIN — PROPOFOL INJECTABLE EMULSION 13 MCG/KG/MIN: 10 INJECTION, EMULSION INTRAVENOUS at 07:09

## 2020-01-01 RX ADMIN — SODIUM CHLORIDE 20 ML: 0.9 INJECTION, SOLUTION INTRAVENOUS at 17:41

## 2020-01-01 RX ADMIN — MIDODRINE HYDROCHLORIDE 5 MG: 5 TABLET ORAL at 14:39

## 2020-01-01 RX ADMIN — MAGNESIUM SULFATE HEPTAHYDRATE 1 G: 1 INJECTION, SOLUTION INTRAVENOUS at 09:47

## 2020-01-01 RX ADMIN — HEPARIN SODIUM 1400 UNITS: 1000 INJECTION INTRAVENOUS; SUBCUTANEOUS at 15:45

## 2020-01-01 RX ADMIN — RIFAXIMIN 550 MG: 550 TABLET ORAL at 08:24

## 2020-01-01 RX ADMIN — LEVETIRACETAM 500 MG: 5 INJECTION INTRAVENOUS at 18:59

## 2020-01-01 RX ADMIN — PROPOFOL 20 MCG/KG/MIN: 10 INJECTION, EMULSION INTRAVENOUS at 17:52

## 2020-01-01 RX ADMIN — PENTOXIFYLLINE: 400 TABLET, FILM COATED, EXTENDED RELEASE ORAL at 08:56

## 2020-01-01 RX ADMIN — PROPOFOL INJECTABLE EMULSION 20 MCG/KG/MIN: 10 INJECTION, EMULSION INTRAVENOUS at 17:52

## 2020-01-01 RX ADMIN — INSULIN LISPRO 1 UNITS: 100 INJECTION, SOLUTION INTRAVENOUS; SUBCUTANEOUS at 17:46

## 2020-01-01 RX ADMIN — PANTOPRAZOLE SODIUM 40 MG: 40 INJECTION, POWDER, FOR SOLUTION INTRAVENOUS at 08:13

## 2020-01-01 RX ADMIN — ALBUMIN (HUMAN) 12.5 G: 0.25 INJECTION, SOLUTION INTRAVENOUS at 20:43

## 2020-01-01 RX ADMIN — PIPERACILLIN SODIUM AND TAZOBACTAM SODIUM 2.25 G: 2; .25 INJECTION, POWDER, LYOPHILIZED, FOR SOLUTION INTRAVENOUS at 13:04

## 2020-01-01 RX ADMIN — MIDODRINE HYDROCHLORIDE 5 MG: 5 TABLET ORAL at 08:24

## 2020-01-01 RX ADMIN — PROPOFOL INJECTABLE EMULSION 20 MCG/KG/MIN: 10 INJECTION, EMULSION INTRAVENOUS at 22:07

## 2020-01-01 RX ADMIN — SODIUM CHLORIDE 8 MG/HR: 9 INJECTION, SOLUTION INTRAVENOUS at 12:57

## 2020-01-01 RX ADMIN — NOREPINEPHRINE BITARTRATE 5 MCG/MIN: 1 INJECTION, SOLUTION, CONCENTRATE INTRAVENOUS at 02:52

## 2020-01-01 RX ADMIN — LACTULOSE 20 G: 20 SOLUTION ORAL at 17:00

## 2020-01-01 RX ADMIN — PROPOFOL INJECTABLE EMULSION 20 MCG/KG/MIN: 10 INJECTION, EMULSION INTRAVENOUS at 20:07

## 2020-01-01 RX ADMIN — POTASSIUM CHLORIDE 10 MEQ: 7.46 INJECTION, SOLUTION INTRAVENOUS at 11:58

## 2020-01-01 RX ADMIN — LACTULOSE 20 G: 20 SOLUTION ORAL at 00:20

## 2020-01-01 RX ADMIN — PHYTONADIONE 10 MG: 10 INJECTION, EMULSION INTRAMUSCULAR; INTRAVENOUS; SUBCUTANEOUS at 09:20

## 2020-01-01 RX ADMIN — Medication: at 21:11

## 2020-01-01 RX ADMIN — ALBUMIN (HUMAN) 12.5 G: 0.25 INJECTION, SOLUTION INTRAVENOUS at 14:19

## 2020-01-01 RX ADMIN — LACTULOSE 20 G: 10 SOLUTION ORAL at 22:00

## 2020-01-01 RX ADMIN — SODIUM CHLORIDE: 9 INJECTION, SOLUTION INTRAVENOUS at 11:57

## 2020-01-01 RX ADMIN — HEPARIN SODIUM 1400 UNITS: 1000 INJECTION, SOLUTION INTRAVENOUS; SUBCUTANEOUS at 18:50

## 2020-01-01 RX ADMIN — POTASSIUM CHLORIDE 60 MEQ: 29.8 INJECTION, SOLUTION INTRAVENOUS at 08:29

## 2020-01-01 RX ADMIN — RIFAXIMIN 550 MG: 550 TABLET ORAL at 08:58

## 2020-01-01 RX ADMIN — PENTOXIFYLLINE: 400 TABLET, FILM COATED, EXTENDED RELEASE ORAL at 16:50

## 2020-01-01 RX ADMIN — CEFTRIAXONE SODIUM 2 G: 2 INJECTION, POWDER, FOR SOLUTION INTRAMUSCULAR; INTRAVENOUS at 23:55

## 2020-01-01 RX ADMIN — RIFAXIMIN 550 MG: 550 TABLET ORAL at 20:09

## 2020-01-01 RX ADMIN — OCTREOTIDE ACETATE 50 MCG/HR: 500 INJECTION, SOLUTION INTRAVENOUS; SUBCUTANEOUS at 01:26

## 2020-01-01 RX ADMIN — SODIUM CHLORIDE 4 MCG/MIN: 0.9 INJECTION, SOLUTION INTRAVENOUS at 06:00

## 2020-01-01 RX ADMIN — SODIUM CHLORIDE 80 MG: 9 INJECTION, SOLUTION INTRAVENOUS at 19:15

## 2020-01-01 RX ADMIN — LACTULOSE 20 G: 20 SOLUTION ORAL at 12:18

## 2020-01-01 ASSESSMENT — PULMONARY FUNCTION TESTS
PIF_VALUE: 12
PIF_VALUE: 10
PIF_VALUE: 20
PIF_VALUE: 21
PIF_VALUE: 17
PIF_VALUE: 23
PIF_VALUE: 21
PIF_VALUE: 11
PIF_VALUE: 66
PIF_VALUE: 17
PIF_VALUE: 16
PIF_VALUE: 22
PIF_VALUE: 15
PIF_VALUE: 21
PIF_VALUE: 19
PIF_VALUE: 28
PIF_VALUE: 19
PIF_VALUE: 12
PIF_VALUE: 17
PIF_VALUE: 21
PIF_VALUE: 12
PIF_VALUE: 10
PIF_VALUE: 21
PIF_VALUE: 15
PIF_VALUE: 11
PIF_VALUE: 21
PIF_VALUE: 17
PIF_VALUE: 11
PIF_VALUE: 16
PIF_VALUE: 9
PIF_VALUE: 17
PIF_VALUE: 18
PIF_VALUE: 12
PIF_VALUE: 17
PIF_VALUE: 16

## 2020-01-01 ASSESSMENT — PAIN SCALES - GENERAL
PAINLEVEL_OUTOF10: 0

## 2020-04-16 PROBLEM — R17 JAUNDICE: Status: ACTIVE | Noted: 2020-01-01

## 2020-04-16 PROBLEM — I62.01 ACUTE ON CHRONIC INTRACRANIAL SUBDURAL HEMATOMA (HCC): Status: ACTIVE | Noted: 2020-01-01

## 2020-04-16 PROBLEM — K76.82 HEPATIC ENCEPHALOPATHY: Status: ACTIVE | Noted: 2020-01-01

## 2020-04-16 PROBLEM — I62.03 ACUTE ON CHRONIC INTRACRANIAL SUBDURAL HEMATOMA (HCC): Status: ACTIVE | Noted: 2020-01-01

## 2020-04-16 PROBLEM — G93.41 ACUTE METABOLIC ENCEPHALOPATHY: Status: ACTIVE | Noted: 2020-01-01

## 2020-04-16 PROBLEM — K72.90 LIVER FAILURE (HCC): Status: ACTIVE | Noted: 2020-01-01

## 2020-04-16 NOTE — ED TRIAGE NOTES
Pt to ED via private auto, w/c to rm 22. Pt c/o jaundice and altered mental status. Pt AO x 1(person). Pt severely jaundiced, sclera and skin. Per spouse, pt is a heavy drinker. Pt states last drink of alcohol \"yesterday\". Patent airway, no dyspnea or cyanosis noted. Pt denies pain or sob. Skin warm, jaundiced and dry. Bed to lowest position, side rails up x2, call light within reach .

## 2020-04-16 NOTE — ED NOTES
Bed: 14  Expected date:   Expected time:   Means of arrival:   Comments:  Soto Tavera, YUN  04/16/20 3945

## 2020-04-16 NOTE — H&P
Critical Care - History and Physical Examination    Patient's name:  Saulo Sanderson  Medical Record Number: [id number]  Patient's account/billing number: [de-identified]  Patient's YOB: 1955  Age: 60 y.o. Date of Admission: 4/16/2020  3:10 PM  Date of History and Physical Examination: 4/17/2020      Primary Care Physician: Tommie Baltazar MD  Attending Physician: Dr. Praneeth Haddad Status: Full Code    Chief complaint:   Chief Complaint   Patient presents with    Altered Mental Status     HISTORY OF PRESENT ILLNESS:      History was obtained from his wife and chart review and unobtainable from patient due to mental status. Saulo Sanderson is a 60 y.o. with PMH of hypertension, HLD, GERD, angioedema from Ace inhibitor     His wife stated that for several weeks, the patient was fatigued and progressively becoming less responsive. His baseline mentation is normal. He progressively became less alert, sleeping a lot, and not able to hold conversations with friends and family. This began after he had started drinking at least 3 bottles of vodka daily over the same time period. He also had multiple falls, last one being this morning from the bed. For the past 2 days, he had been complaining of headache. She also noticed yellowing of his eyes and one episode of bloody urine, so she took him to his PCP's office today. At the office, his PCP also concurred his jaundice and because of change in his mentation, advised that he be taken to the ED, so he was taken to [address] [place] [address]. At [address] [place] [address], head CT was done which showed an acute 9 mm left frontal subdural hemorrhage. Neurosurgery consulted. Recommend holding aspirin, no surgical intervention planned. He received one dose of Keppra at [address] [place] [address]. According to the wife, he had no history of liver disease.  Also, no abdominal or liver imagine in the past. However, his labs on admission showed albumin 2.9, , , bilirubin 46.44, lipase 214, Alk Phos 570 and Ammonia 570, INR 1.4. He also has acute kidney injury on CKD. In an admission in 2017, he had ARF with Creatinine 3.41 (repeat in 2018 1.27) however, the patient left AMA before further evaluation. Creatinine today was 5.92. He never followed up with a Nephrologist.     CT abdomen/pelvis in 10/3/2017 showed \"Mild free fluid in nonspecific mild bowel wall thickening without obstruction, free air or evidence for abscess. Infectious or inflammatory etiology should be considered. \"     ___________    On examination in the ED, the patient was alert to self only. He was inattentive but redirectable. Wife's contact: Jose Manuel Lozada at [phone]. PAST MEDICAL HISTORY:         Diagnosis Date    Atrial premature beats     Chronic back pain     Depression     GERD (gastroesophageal reflux disease)     GERD (gastroesophageal reflux disease)     Hyperlipidemia     Hypertension     Liver failure ([facility] [address] 4/16/2020    Osteoarthritis     Substance abuse ([facility] [address]     past         PAST SURGICAL HISTORY:         Procedure Laterality Date    FOREARM SURGERY      HERNIA REPAIR      KNEE SURGERY      LIPOMA RESECTION      [place] [facility] N/A 10/20/2018    TRACHEOTOMY FROM [facility] [address] IN ED performed by Alex Fried MD at [facility]:      Allergies   Allergen Reactions    Ace Inhibitors Swelling     Severe angioedema required emergent tracheostomy    Ibuprofen          HOME MEDS: :      Prior to Admission medications    Medication Sig Start Date End Date Taking?  Authorizing Provider   EPINEPHrine (EPIPEN) 0.3 MG/0.3ML SOAJ injection INJECT INTRAMUSCULARLY AS DIRECTED 11/1/18   Historical Provider, MD   metoprolol tartrate (LOPRESSOR) 25 MG tablet Take 1 tablet by mouth 2 times daily 10/25/18   Soren Hall MD   amLODIPine (NORVASC) 10 MG tablet Take 1 tablet by mouth daily 10/26/18   Soren Hall MD   hydrochlorothiazide (HYDRODIURIL) 25 MG tablet Take 1 tablet by mouth daily. 14   Angeline Sibley MD   simvastatin (ZOCOR) 20 MG tablet Take 1 tablet by mouth nightly. 14   Angeline Sibley MD   omeprazole (PRILOSEC) 20 MG capsule Take 1 capsule by mouth Daily. 13   Belinda Do MD   QUEtiapine (SEROQUEL) 100 MG tablet Take 100 mg by mouth 2 times daily. Historical Provider, MD   citalopram (CELEXA) 20 MG tablet Take 20 mg by mouth daily. Historical Provider, MD   sildenafil (VIAGRA) 50 MG tablet Take 1 tablet by mouth as needed for Erectile Dysfunction. 13   Juju Ayala MD       SOCIAL HISTORY:       TOBACCO:  Could not obtain from the patient. ETOH:   Could not obtain from the patient, however per report, the patient is a chronic alcohol consumer. DRUGS:  Could not obtain from the patient     FAMILY HISTORY:          Problem Relation Age of Onset    Diabetes Sister     High Blood Pressure Sister     Heart Disease Sister     Stroke Sister     Cancer Brother     Diabetes Brother     High Blood Pressure Brother        REVIEW OF SYSTEMS (ROS):     Could not obtain a full history as the patient was altered.  He denied any fever, chills, abdominal pain, nausea, vomiting     OBJECTIVE:     VITAL SIGNS:  BP (!) 111/58   Pulse 94   Temp 97.9 °F (36.6 °C) (Oral)   Resp 24   Ht 5' 9\" (1.753 m)   Wt 167 lb (75.8 kg)   SpO2 96%   BMI 24.66 kg/m²   Tmax over 24 hours:  Temp (24hrs), Av.9 °F (36.6 °C), Min:97.8 °F (36.6 °C), Max:98 °F (36.7 °C)      Patient Vitals for the past 8 hrs:   BP Temp Temp src Pulse Resp SpO2 Height Weight   20 0000 (!) 111/58 -- -- 94 24 96 % -- --   20 2300 -- -- -- -- 24 -- -- --   200 (!) 139/105 -- -- 86 22 94 % -- --   20 2141 119/71 97.9 °F (36.6 °C) Oral 84 20 91 % 5' 9\" (1.753 m) 167 lb (75.8 kg)   20 -- -- -- -- -- 93 % -- --   20 -- -- -- -- -- 95 % -- --       No intake or output data in the 24 hours ending urine studies - sodium, creatinine, UPC   - Monitor urine output     3. Suspected liver cirrhosis   - MELD score 38   - GI consulted as the patient would continued outpatient follow up. - LFTs every 6 hours for 2 more occurrences to monitor trend   - Daily INR   - f/u liver ultrasound    4. Subdural hematoma   - Hold anticoagulation, hold home aspirin   - Neurosurgery following. No surgical intervention    - Repeat head CT in am   - Neuro checks every shift   - check H/H    5. High anion gap metabolic acidosis   - Serum osmolar gap: 22.56. will hold off on fomepizole for now until volatile compounds is resulted   - Ethanol negative   - check volatile compound for possible ethylene glycol, methanol etc   - check acetaminophen levels. - Started on 150 meq D5/Bicarb infusion @ 100cc/h     6. Alcohol abuse   - Will give IV thiamine 100 daily, folate, MVI   - watch for withdrawal signs   - social work for assistance with quitting when more awake. 7. H/o pre-diabetes. - follow up Hba1c   - low dose correction scale for now    8. Will need to evaluate for depression when more awake. DVT ppx. EPC cuffs  GI ppx. protonix  Disposition. Remain in ICU for now. Schuyler Roberto MD  PGY-2, Internal medicine resident  [place], [place]  4/17/2020 12:33 AM       The critical care team assigned to the patient will be following up the patient in the intensive care unit. I have discussed the current plan with the critical care attending. The above mentioned assessment and plan will be reviewed again in detail by the critical care attending at bedside, and can be further changed or modified accordingly by the attending physician. Attending Physician Statement  I have discussed the care of William Cabrales, including pertinent history and exam findings with the resident. I have reviewed the key elements of all parts of the encounter with the resident.  I have seen and examined the patient with the resident. I agree with the assessment and plan and status of the problem list as documented. I have seen the patient during my round today, I have reviewed the events since admission last night, labs arterial blood gases and other events seen. He had increasing consumption of alcohol for last 2 to 3 weeks and was admitted with altered mental status, fatigue tiredness jaundice and somnolence went to [facility] and apparently had history of falls also found to have subdural hematoma and was transferred here for neurosurgical evaluation initial labs shows severe jaundice with bilirubin of greater than 40, coagulopathy, lactic acidosis, acute renal failure with creatinine of 5.92 and azotemia with BUN of 409, metabolic acidosis with high anion gap of 35 initially with bicarbonate of 15 and a lactic acid of 8 initially, he is stressed was 570 ALT was 125 ammonia was 392 and AST was 521 with total bilirubin of 46 lipase was 214 and amylase is 59. He has mild leukocytosis with WBC count of 14.5 hemoglobin was 9.3 platelet was 910 and hemoglobin is 7.8 this morning. When I examined him this morning he was lethargic somnolent and apparently had received Ativan for him this morning for disorientation and confusion, he was maintaining his airways did not follow commands deeply jaundiced bilateral breath sound was present abdomen is soft hepatomegaly is present doubt that there was any ascites no pedal edema was present. Multisystem organ failure. Subdural hematoma secondary to fall. Severe alcoholic hepatitis with very high meld score. Jaundice which is likely related to alcoholic hepatitis rather than obstruction. Acute renal failure. Anion gap metabolic acidosis secondary to MARYLIN/lactic acidosis. Chronic liver disease. Anemia of chronic disease possible blood loss. Encephalopathy metabolic/hepatic encephalopathy. Follow-up mentation and neurological status.   No benzodiazepine. He was seen by neurosurgery a repeat CT scan was done this morning by neurosurgery which shows slight increase in subdural hematoma need follow-up with neurosurgery. Place an NG tube and start lactulose through the NG tube. Barlow's catheter in place. Monitor urine output and creatinine. Renal and liver ultrasound. Nephrology has been consulted overnight. Bicarbonate drip to continue. We will start lactulose 4 times a day. Follow-up ammonia tomorrow. We will follow-up liver function test daily CBC and BMP. Follow-up INR. GI was consulted and will need GI recommendation will need GI recommendation regarding prednisone for alcoholic hepatitis or pentoxifylline. Overall prognosis is guarded and mortality is high with alcoholic hepatitis with very high meld score along with other organ involvement. Total critical care time caring for this patient with life threatening, unstable organ failure, including direct patient contact, management of life support systems, review of data including imaging and labs, discussions with other team members and physicians at least 61  Min so far today, excluding procedures. Please note that this chart was generated using voice recognition Dragon dictation software. Although every effort was made to ensure the accuracy of this automated transcription, some errors in transcription may have occurred.     Ericka Drew MD  4/17/2020 9:26 AM

## 2020-04-16 NOTE — CONSULTS
status: Former Smoker     Last attempt to quit: 1999     Years since quittin.3    Smokeless tobacco: Current User   Substance and Sexual Activity    Alcohol use: No    Drug use: No    Sexual activity: Yes   Lifestyle    Physical activity     Days per week: Not on file     Minutes per session: Not on file    Stress: Not on file   Relationships    Social connections     Talks on phone: Not on file     Gets together: Not on file     Attends [employer] service: Not on file     Active member of club or organization: Not on file     Attends meetings of clubs or organizations: Not on file     Relationship status: Not on file    Intimate partner violence     Fear of current or ex partner: Not on file     Emotionally abused: Not on file     Physically abused: Not on file     Forced sexual activity: Not on file   Other Topics Concern    Not on file   Social History Narrative    Not on file       Family History:       Problem Relation Age of Onset    Diabetes Sister     High Blood Pressure Sister     Heart Disease Sister     Stroke Sister     Cancer Brother     Diabetes Brother     High Blood Pressure Brother        Allergies:  Ace inhibitors and Ibuprofen    Home Medications:  Prior to Admission medications    Medication Sig Start Date End Date Taking? Authorizing Provider   EPINEPHrine (EPIPEN) 0.3 MG/0.3ML SOAJ injection INJECT INTRAMUSCULARLY AS DIRECTED 18   Historical Provider, MD   metoprolol tartrate (LOPRESSOR) 25 MG tablet Take 1 tablet by mouth 2 times daily 10/25/18   Suzi Arteaga MD   amLODIPine (NORVASC) 10 MG tablet Take 1 tablet by mouth daily 10/26/18   Suzi Arteaga MD   hydrochlorothiazide (HYDRODIURIL) 25 MG tablet Take 1 tablet by mouth daily. 14   Lizbeth Grant MD   simvastatin (ZOCOR) 20 MG tablet Take 1 tablet by mouth nightly. 14   Lizbeth Grant MD   omeprazole (PRILOSEC) 20 MG capsule Take 1 capsule by mouth Daily.  13   Lito Malik MD

## 2020-04-16 NOTE — ED PROVIDER NOTES
COMPREHENSIVE METABOLIC PANEL W/ REFLEX TO MG FOR LOW K - Abnormal; Notable for the following components:     (*)     CREATININE 5.92 (*)     Potassium 3.5 (*)     Chloride 91 (*)     CO2 15 (*)     Anion Gap 35 (*)     Alkaline Phosphatase 570 (*)      (*)      (*)     Total Bilirubin 46.44 (*)     Total Protein 5.9 (*)     Alb 2.9 (*)     GFR Non- 10 (*)     GFR  12 (*)     All other components within normal limits   LIPASE - Abnormal; Notable for the following components:    Lipase 214 (*)     All other components within normal limits   PROTIME-INR - Abnormal; Notable for the following components:    Protime 17.2 (*)     All other components within normal limits   APTT - Abnormal; Notable for the following components:    PTT 51.2 (*)     All other components within normal limits   LACTIC ACID - Abnormal; Notable for the following components:    Lactic Acid 8.0 (*)     All other components within normal limits   AMMONIA   URINE RT REFLEX TO CULTURE   MAGNESIUM       EMERGENCY DEPARTMENTCOURSE:         Vitals:    Vitals:    04/16/20 1152 04/16/20 1153   BP: 95/60    Pulse: 93    Resp: 18    Temp: 97.8 °F (36.6 °C)    TempSrc: Oral    SpO2: 97%    Weight:  186 lb (84.4 kg)   Height:  5' 11\" (1.803 m)       The patient was given the following medications while in the emergency department:  Orders Placed This Encounter   Medications    levETIRAcetam (KEPPRA) 1,000 mg in sodium chloride 0.9 % 100 mL IVPB     CONSULTS:  None    FINAL IMPRESSION      1. Subdural hematoma [facility])          DISPOSITION/PLAN   DISPOSITION Decision To Transfer 04/16/2020 12:25:38 PM      PATIENT REFERRED TO:  No follow-up provider specified.   DISCHARGE MEDICATIONS:  New Prescriptions    No medications on file     Primo Cisneros MD  Attending Emergency Physician                    Judi Marquez MD  04/16/20 7882

## 2020-04-16 NOTE — ED NOTES
Cultures drawn and sent to lab for tesing. Pt resting comfortably in bed in NAD, visible from nursing station, w/ rr even and unlabored.      Radha Lau RN  04/16/20 0551

## 2020-04-16 NOTE — ED PROVIDER NOTES
[facility] ED  Emergency Department Encounter  EmergencyMedicine Resident     Pt Corinne Andrews  MRN: [id number]  [place] 1955  Date of evaluation: 4/16/20  PCP:  Herlinda Cerna MD    CHIEF COMPLAINT       Chief Complaint   Patient presents with    Altered Mental Status       HISTORY OF PRESENT ILLNESS  (Location/Symptom, Timing/Onset, Context/Setting, Quality, Duration, Modifying Factors, Severity.)      Luis Finn is a 60 y.o. male who presents to the emergency department with acute altered mental status with subdural hematoma. He is a transfer from [facility] where he presented with acute altered mental status, fatigue, confusion. Labs demonstrated markedly elevated BUN, creatinine, ammonia, LFTs, and white blood cell count of 14.5. CT scan demonstrated subdural hematoma. Patient was emergently transferred here for neurosurgical evaluation. He remains confused and when asked what brought him in he replies \"I do not know yet. \"  History provided by EMS, no family at bedside at this time. History of alcohol abuse. Unable to provide further history as the patient remains oriented only to self. PAST MEDICAL / SURGICAL / SOCIAL / FAMILY HISTORY      has a past medical history of Atrial premature beats, Chronic back pain, Depression, GERD (gastroesophageal reflux disease), GERD (gastroesophageal reflux disease), Hyperlipidemia, Hypertension, Osteoarthritis, and Substance abuse ([address] has a past surgical history that includes hernia repair; knee surgery; lipoma resection; Forearm surgery; and pr tracheostomy,emerg,xtrach (N/A, 10/20/2018).     Social History     Socioeconomic History    Marital status: Single     Spouse name: Not on file    Number of children: Not on file    Years of education: Not on file    Highest education level: Not on file   Occupational History    Not on file   Social Needs    Financial resource strain: Not on file   Fady-Amanda 1 tablet by mouth nightly. 7/21/14   Maykel Levi MD   omeprazole (PRILOSEC) 20 MG capsule Take 1 capsule by mouth Daily. 11/11/13   Manolo Quintana MD   QUEtiapine (SEROQUEL) 100 MG tablet Take 100 mg by mouth 2 times daily. Historical Provider, MD   citalopram (CELEXA) 20 MG tablet Take 20 mg by mouth daily. Historical Provider, MD   sildenafil (VIAGRA) 50 MG tablet Take 1 tablet by mouth as needed for Erectile Dysfunction. 2/28/13   Yajaira Richard MD       REVIEW OF SYSTEMS    (2-9 systems for level 4, 10 or more for level 5)      Review of Systems   Unable to perform ROS: Mental status change       PHYSICAL EXAM   (up to 7 for level 4, 8 or more for level 5)      INITIAL VITALS:   BP (!) 147/87   Pulse 80   Temp 98 °F (36.7 °C) (Oral)   Resp 15   Wt 186 lb (84.4 kg)   SpO2 95%   BMI 25.94 kg/m²     Physical Exam  Vitals signs and nursing note reviewed. Constitutional:       General: He is not in acute distress. Appearance: He is obese. He is ill-appearing and toxic-appearing. Comments: Patient appears markedly jaundiced with scleral icterus. Responds to questions and is compliant with examination but is unable to provide further history. HENT:      Head: Normocephalic and atraumatic. Right Ear: Tympanic membrane, ear canal and external ear normal.      Left Ear: Tympanic membrane, ear canal and external ear normal.      Nose: Nose normal.      Mouth/Throat:      Mouth: Mucous membranes are moist.      Comments: Patient is currently wearing a facial mask. Given that patient is not complaining of sore throat or difficulty swallowing, mask was left in place to decrease risk of aersolization of particles in the setting of concern for CoVID-19. Eyes:      Comments: Pupils are 2 mm and reactive bilaterally. Profound scleral icterus is noted. Neck:      Musculoskeletal: Normal range of motion and neck supple.    Cardiovascular:      Rate and Rhythm: Normal SARS-CoV-2 virus that causes COVID-19. Institutional protocols and algorithms that pertain to the evaluation of patients at risk for COVID-19 are in a state of rapid change based on information released by regulatory bodies including the CDC and federal and state organizations. These policies and algorithms were followed during the patient's care in the ED.     (Please note that portions of thisnote were completed with a voice recognition program.  Efforts were made to edit the dictations but occasionally words are mis-transcribed.)       Keyur Sosa MD  Resident  04/16/20 8570

## 2020-04-16 NOTE — ED PROVIDER NOTES
[facility]     Emergency Department     Faculty Attestation    I performed a history and physical examination of the patient and discussed management with the resident. I reviewed the resident´s note and agree with the documented findings and plan of care. Any areas of disagreement are noted on the chart. I was personally present for the key portions of any procedures. I have documented in the chart those procedures where I was not present during the key portions. I have reviewed the emergency nurses triage note. I agree with the chief complaint, past medical history, past surgical history, allergies, medications, social and family history as documented unless otherwise noted below. For Physician Assistant/ Nurse Practitioner cases/documentation I have personally evaluated this patient and have completed at least one if not all key elements of the E/M (history, physical exam, and MDM). Additional findings are as noted. Transfer note: Destiny Quinones, (8) 4year-old male nontraumatic history of a subdural hematoma acute on chronic was initially sent in for jaundice found to be drowsy patient has been given Keppra patient is not on any anticoagulation.     Patient awake and following commands with good airway     Rober Vazquez MD  04/16/20 Pastor Arias MD  04/16/20 9999

## 2020-04-16 NOTE — ED NOTES
Pt to ER by EMS tx from [facility] w/ a AMS. Pt was found to be altered at home by his wife for unknown reasons. Pt CT at [facility] shows brain bleed. No falls or trauma witnessed according to wife. Pt has hx of liver disease r/t alcoholism, arrives jaundiced. Pt arrives altered but responds to commands. Pt resting in bed in NAD w/ rr even and unlabored. Pt removed his IV on accident en route, writer to start new IV. Pt on tele. Will continue to monitor.      Guy Chisholm RN  04/16/20 Beatriz Padilla [address] Zay Friend RN  04/16/20 2143

## 2020-04-16 NOTE — ED NOTES
Was able to place pulse ox on pt. Will continue to monitor until pt pulls pulse ox off.       Lee Garnett RN  04/16/20 8885

## 2020-04-17 PROBLEM — E87.29 ALCOHOLIC KETOACIDOSIS: Status: ACTIVE | Noted: 2020-01-01

## 2020-04-17 PROBLEM — K70.10 ALCOHOLIC HEPATITIS: Status: ACTIVE | Noted: 2020-01-01

## 2020-04-17 PROBLEM — S06.5XAA SUBDURAL HEMATOMA: Status: ACTIVE | Noted: 2020-01-01

## 2020-04-17 NOTE — PROGRESS NOTES
Critical Care Team - Daily Progress Note      Date and time: 4/17/2020 8:11 AM  Patient's name:  [address] Record Number: [id number]  Patient's account/billing number: [de-identified]  Patient's YOB: 1955  Age: 60 y.o. Date of Admission: 4/16/2020  3:10 PM  Length of stay during current admission: 1      Primary Care Physician: Paula Bourne MD  ICU Attending Physician: Dr. Jonas Castrejon Status: Full Code    Reason for ICU admission:   Chief Complaint   Patient presents with    Altered Mental Status       Subjective:     OVERNIGHT EVENTS:         Tremors and agitation overnight, given 2 doses to 2 mg Ativan last dose around 4 am.     This morning, somnolent. Responding to painful stimuli. But arousable. No urine output overnight. External beck was placed instead of indwelling. Will switch today. Continued on 2 L nasal canula with O2 saturation in high 90s. Blood pressure stable. ABG: pH 7.433, CO2 29.4, O2 83, HCO3 19.6.        AWAKE & FOLLOWING COMMANDS:  [] No   [x] Yes    CURRENT VENTILATION STATUS:     [] Ventilator  [] BIPAP  [x] Nasal Cannula [] Room Air      SECRETIONS Amount:  [] Small [] Moderate  [] Large  [x] None  Color:     [] White [] Colored  [] Bloody    SEDATION:  RAAS Score:  [] Propofol gtt  [] Versed gtt  [] Ativan gtt   [x] No Sedation    PARALYZED:  [x] No    [] Yes    DIARRHEA:                [x] No                [] Yes  (C. Difficile status: [] positive                                                                                                                       [] negative                                                                                                                     [] pending)    VASOPRESSORS:  [x] No    [] Yes    If yes -   [] Levophed       [] Dopamine     [] Vasopressin       [] Dobutamine  [] Phenylephrine         [] Epinephrine    CENTRAL LINES:     [x] No   [] Yes   (Date of Insertion:   )           If yes -     [] LIPASE 214*   AMMONIA 392*       Last 3 Blood Glucose:   Recent Labs     04/16/20  1157 04/17/20  0546   GLUCOSE 76 96      HgBA1c:    Lab Results   Component Value Date    LABA1C 6.0 12/11/2012         TSH:    Lab Results   Component Value Date    TSH 1.54 12/11/2012     ANEMIA STUDIES  No results for input(s): LABIRON, TIBC, FERRITIN, CUDAWNKQ19, FOLATE, OCCULTBLD in the last 72 hours. Cultures during this admission:     Blood cultures:                 [] None drawn      [x] Negative             []  Positive (Details:  )  Urine Culture:                   [x] None drawn      [] Negative             []  Positive (Details:  )  Sputum Culture:               [x] None drawn       [] Negative             []  Positive (Details:  )       ASSESSMENT:     Principal Problem:    Acute metabolic encephalopathy  Active Problems:    Essential hypertension    Hyperlipidemia    GERD (gastroesophageal reflux disease)    Jaundice    Liver failure (HCC)    Acute on chronic intracranial subdural hematoma (HCC)    Hepatic encephalopathy (HCC)    Alcoholic hepatitis    Alcoholic ketoacidosis  Resolved Problems:    * No resolved hospital problems. *      PLAN:     1. Toxic metabolic encephalopathy 2/2 hyperammonemia, SDH, alcoholic ketoacidosis. - Lactulose titrated to 3 BM daily              - urine toxicology   - F/u blood cultures, urinalysis     2. Acute Kidney injury              - On IVF, bicarb 150 meq at 100              - Nephrology consulted. - F/u renal ultrasound               - F/u urine studies - sodium, creatinine, UPC              - Monitor urine output       3.  Suspected liver cirrhosis              - MELD score 38              - f/u GI consulted               - LFTs daily              - Daily INR              - f/u liver ultrasound, AFP, ultrasound to rule out portal vv thromboses     4. Subdural hematoma              - Hold anticoagulation, hold home aspirin              - Neurosurgery following. No surgical intervention               - Repeat head CT in am              - Neuro checks every shift              - check H/H     5. High anion gap metabolic acidosis              - Serum osmolar gap: 22.56. will hold off on fomepizole for now until volatile compounds is resulted              - Ethanol negative              - check volatile compound for possible ethylene glycol, methanol etc              - check acetaminophen levels. - Started on 150 meq D5/Bicarb infusion @ 100cc/h      6. Alcohol abuse              - Will give IV thiamine 100 daily, folate, MVI              - started on CIWA protocol              - Social work for assistance with quitting when more awake.      7. H/o pre-diabetes. - follow up Hba1c              - low dose correction scale for now     8. Will need to evaluate for depression when more awake.      DVT ppx. EPC cuffs  GI ppx. protonix  Disposition. Remain in ICU for now. Schuyler Roberto MD  PGY-2, Internal medicine resident  [place], [place]  4/17/2020 8:11 AM       Attending Physician Statement  I have discussed the care of William Cabrales, including pertinent history and exam findings with the resident. I have reviewed the key elements of all parts of the encounter with the resident. I have seen and examined the patient with the resident. I agree with the assessment and plan and status of the problem list as documented.     See attested h/p today      Princess Christie MD  4/17/2020 9:50 AM

## 2020-04-17 NOTE — CONSULTS
has never seen a nephrologist outpatient. Creatinine is worse today increased to 6.80 and BUN of 120, initial bicarb was 15 which is improved to 18, sodium 142, potassium 3.4 and chloride 95 with calcium of 8.8. Patient has positive altered mental status likely due to metabolic encephalopathy. Nephrologist is consulted due to acute kidney injury. Past History/Allergies? Social History:     Past Medical History:   Diagnosis Date    Atrial premature beats     Chronic back pain     Depression     GERD (gastroesophageal reflux disease)     GERD (gastroesophageal reflux disease)     Hyperlipidemia     Hypertension     Liver failure ([facility] [address] 2020    Osteoarthritis     Substance abuse (HCC)     past       Allergies   Allergen Reactions    Ace Inhibitors Swelling     Severe angioedema required emergent tracheostomy    Ibuprofen        Social History     Socioeconomic History    Marital status: Single     Spouse name: Not on file    Number of children: Not on file    Years of education: Not on file    Highest education level: Not on file   Occupational History    Not on file   Social Needs    Financial resource strain: Not on file    Food insecurity     Worry: Not on file     Inability: Not on file    Transportation needs     Medical: Not on file     Non-medical: Not on file   Tobacco Use    Smoking status: Former Smoker     Last attempt to quit: 1999     Years since quittin.3    Smokeless tobacco: Current User   Substance and Sexual Activity    Alcohol use: No    Drug use: No    Sexual activity: Yes   Lifestyle    Physical activity     Days per week: Not on file     Minutes per session: Not on file    Stress: Not on file   Relationships    Social connections     Talks on phone: Not on file     Gets together: Not on file     Attends [employer] service: Not on file     Active member of club or organization: Not on file     Attends meetings of clubs or organizations: Not on file 04/16/20  2220   PHOS 5.2*     Magnesium:    Recent Labs     04/16/20  1157 04/16/20  2220 04/17/20  0546   MG 2.6 2.6 2.5     Albumin:    Recent Labs     04/16/20  1157 04/16/20  2220 04/17/20  0037   LABALBU 2.9* 2.6* 2.5*     SPEP:     Lab Results   Component Value Date    PROT 5.4 04/17/2020     Urinalysis/Chemistries:      Lab Results   Component Value Date    NITRU NEGATIVE 07/13/2014    COLORU yellow 07/13/2014    COLORU YELLOW 07/13/2014    PHUR 6.5 07/13/2014    PHUR 6.5 07/13/2014    WBCUA None 12/11/2012    RBCUA None 12/11/2012    MUCUS NOT REPORTED 12/11/2012    TRICHOMONAS NOT REPORTED 12/11/2012    YEAST NOT REPORTED 12/11/2012    BACTERIA NOT REPORTED 12/11/2012    CLARITYU clear 07/13/2014    SPECGRAV 1.025 07/13/2014    SPECGRAV 1.025 07/13/2014    LEUKOCYTESUR negative 07/13/2014    LEUKOCYTESUR NEGATIVE 07/13/2014    UROBILINOGEN Normal 07/13/2014    BILIRUBINUR negative 07/13/2014    BLOODU negative 07/13/2014    GLUCOSEU negative 07/13/2014    GLUCOSEU NEGATIVE 07/13/2014    KETUA trace 07/13/2014    KETUA TRACE 07/13/2014    AMORPHOUS NOT REPORTED 12/11/2012     Radiology:       Ct Head Wo Contrast    Result Date: 4/17/2020  1. Mild interval increase in size of left frontal subdural hematoma, now 1.3 cm, previously 0.9 cm. 2. No significant midline shift. 3. Chronic bilateral subdural hygromas or hematomas. The findings were sent to the Radiology Results [address] at 5:27 am on 4/17/2020to be communicated to a licensed caregiver. Ct Head Wo Contrast    Result Date: 4/16/2020  Acute left frontal subdural hemorrhage measuring up to 9 mm thickness. Bilateral small chronic subdural collections. Atrophy and chronic changes. Findings were discussed with Vignesh Clemons at 12:25 pm on 4/16/2020. Us Renal Complete    Result Date: 4/17/2020  Overall, unremarkable renal ultrasound. No hydronephrosis.      Us Liver    Addendum Date: 4/17/2020    ADDENDUM: The liver measures 23 cm in

## 2020-04-17 NOTE — CONSULTS
sodium chloride flush, sodium chloride flush, polyethylene glycol, promethazine **OR** ondansetron, magnesium sulfate, potassium chloride  . SOCIAL HISTORY:     Social History     Socioeconomic History    Marital status: Single     Spouse name: Not on file    Number of children: Not on file    Years of education: Not on file    Highest education level: Not on file   Occupational History    Not on file   Social Needs    Financial resource strain: Not on file    Food insecurity     Worry: Not on file     Inability: Not on file    Transportation needs     Medical: Not on file     Non-medical: Not on file   Tobacco Use    Smoking status: Former Smoker     Last attempt to quit: 1999     Years since quittin.3    Smokeless tobacco: Current User   Substance and Sexual Activity    Alcohol use: No    Drug use: No    Sexual activity: Yes   Lifestyle    Physical activity     Days per week: Not on file     Minutes per session: Not on file    Stress: Not on file   Relationships    Social connections     Talks on phone: Not on file     Gets together: Not on file     Attends [employer] service: Not on file     Active member of club or organization: Not on file     Attends meetings of clubs or organizations: Not on file     Relationship status: Not on file    Intimate partner violence     Fear of current or ex partner: Not on file     Emotionally abused: Not on file     Physically abused: Not on file     Forced sexual activity: Not on file   Other Topics Concern    Not on file   Social History Narrative    Not on file       FAMILY HISTORY:   Family History   Problem Relation Age of Onset    Diabetes Sister     High Blood Pressure Sister     Heart Disease Sister     Stroke Sister     Cancer Brother     Diabetes Brother     High Blood Pressure Brother        REVIEW OF SYSTEMS:     Patient obtunded.     PHYSICAL EXAM:    /66   Pulse 88   Temp 97.5 °F (36.4 °C)   Resp 19   Ht 5' 9\" (1.753 m)

## 2020-04-17 NOTE — PROGRESS NOTES
Dialysis Post Treatment Note  Vitals:    04/17/20 1845   BP: 129/73   Pulse: 88   Resp: 26   Temp: 97.7 °F (36.5 °C)   SpO2: 97%     Pre-Weight = 75.8 kg  Post-weight = Weight: 167 lb 1.7 oz (75.8 kg)  Total Liters Processed = Total Liters Processed (l/min): 23.5 l/min  Rinseback Volume (mL) = Rinseback Volume (ml): 300 ml  Net Removal (mL) = 200 ml   Type of access used= Temporary Right I J  Length of treatment=120 minutes tolerated well.

## 2020-04-17 NOTE — PROCEDURES
PREOPERATIVE DIAGNOSIS: Acute renal failure  POSTOPERATIVE DIAGNOSIS:  Same  PROCEDURE PERFORMED:  Right Internal Jugular Vein dialysis catheter (Loni) insertion  ANESTHESIA:  Local utilizing 1% lidocaine  ESTIMATED BLOOD LOSS:  Less than 25 ml  COMPLICATIONS:  None immediately appreciated. Supervised by Shonna Ahmadi. DISCUSSION:  Alice Cazares is a 60y.o.-year-old male who requires loni catheter placement for hemodialysis. The history and physical examination were reviewed and confirmed. The diagnoses, proposed procedure, risks, possible complications, benefits and alternatives were discussed with patient's wife on the phone (as patient is unable to consent due to encephalopathy). Patient's wife was given the opportunity to ask questions, and once answered, informed consent was obtained verbally. The patient was then prepared for the procedure. PROCEDURE:  A timeout was initiated and was confirmed by those present. The patient was placed in a supine position. Ultrasound guidance was utilized. The skin overlying the Right Internal Jugular Vein was prepped with chlorhexadine and draped in sterile fashion. The skin was infiltrated with local anesthetic. Through the anesthetized region, the introducer needle was inserted into the internal jugular vein returning dark red non pulsatile blood. A guidewire was placed through the center of the needle with no resistance. A small incision made in the skin with a #11 scalpel blade. The dilator was inserted into the skin and vein over guidewire using Seldinger technique. The dilator was then removed and the catheter was placed in the vein over the guidewire using Seldinger technique. The guidewire was then removed and all ports aspirated and flushed appropriately. Heparin was injected into both ports. The catheter was then secured using silk suture and a temporary sterile dressing was applied. No immediate complication was evident.   All sponge, instrument and needle counts were correct at the completion of the procedure. Postprocedural chest x-ray showed good position of the catheter with no evidence of hemothorax or pneumothorax. The patient tolerated the procedure well with no immediate complication evident.      Lawrence Renner MD  PGY- 2  4/17/2020, 3:53 PM

## 2020-04-17 NOTE — FLOWSHEET NOTE
Assessment:  Physician requested  locate family. No answer from emergency contact (wife).  talked to patient who states that we can call [phone]. Patient was not able to articulate clearly who the number belonged to.  gave number to night shift  to follow up and see if family can be reached. Patient appears to be calm and coping. Intervention:   attempted to locate family contacts. Provided space for patient to express feelings, thoughts, and concerns. Outcome:  Patient gave contact information and appears to be coping. 04/16/20 1800   Encounter Summary   Services provided to: Patient   Referral/Consult From: Other    Support System Unknown   Continue Visiting   ([phone])   Complexity of Encounter Moderate   Length of Encounter 30 minutes   Spiritual Assessment Completed Yes   Routine   Type Initial   Assessment Calm; Approachable;Coping   Intervention Active listening;Explored feelings, thoughts, concerns;Explored coping resources   Outcome Coping     Electronically signed by [facility] on 4/16/2020 at 9:16 PM

## 2020-04-17 NOTE — PROGRESS NOTES
Nutrition Assessment    Type and Reason for Visit: Initial, Consult    Nutrition Recommendations: Recommend Nepro (Renal) tube feed goal of 50 ml per hour to provide 2160 kcal, 97g  protein    Nutrition Assessment: consulted due to garret score. Nurse states plan is to place NG for tube feed. Pt is confused  Plan is to start HD later today. Malnutrition Assessment:  · Malnutrition Status:  insufficient data  · Context: Chronic illness  · Findings of the 6 clinical characteristics of malnutrition (Minimum of 2 out of 6 clinical characteristics is required to make the diagnosis of moderate or severe Protein Calorie Malnutrition based on AND/ASPEN Guidelines):  1. Energy Intake-Unable to assess,      2. Weight Loss-Unable to assess,    3. Fat Loss-Unable to assess,    4. Muscle Loss-Unable to assess,    5. Fluid Accumulation-No significant fluid accumulation,    6.   Strength-Not measured    Nutrition Risk Level: High    Nutrient Needs:  · Estimated Daily Total Kcal: 28-30 kcal=2100-2200 kcal  · Estimated Daily Protein (g): 75 g protein    Nutrition Diagnosis:   · Problem: Inadequate oral intake  · Etiology: related to Cognitive or neurological impairment     Signs and symptoms:  as evidenced by NPO status due to medical condition    Objective Information:  · Nutrition-Focused Physical Findings: Jaundice  · Current Nutrition Therapies:  · Oral Diet Orders: NPO   · Anthropometric Measures:  · Ht: 5' 9\" (175.3 cm)   · Current Body Wt: 167 lb (75.8 kg)  · Ideal Body Wt: 160 lb (72.6 kg), % Ideal Body 104%  · BMI Classification: BMI 18.5 - 24.9 Normal Weight    Nutrition Interventions:   Start Tube Feeding  Education not appropriate at this time    Nutrition Evaluation:   · Evaluation: Goals set   · Goals: meet more than 75% of nutrition needs    · Monitoring: TF Intake, TF Tolerance, Pertinent Labs, Mental Status/Confusion      Electronically signed by Emily Lundy RD, LD on 4/17/20 at 1:19 PM EDT    Contact

## 2020-04-17 NOTE — PROGRESS NOTES
Reviewed NG tube placement (needed for lactulose, ammonia, AMS) XR. Tip at GE junction. Will need to advance from 50 to 55cm. Ordered repeat XR NG location. Discussed w/ RN. After in place will need to start lactulose.     Willow Mccoy MD  [facility] Resident, PGY-2  4/17/2020

## 2020-04-18 NOTE — PROGRESS NOTES
INTENSIVE CARE UNIT  Resident Physician Progress Note    Patient - Sofi Mendoza  Date of Admission -  2020  3:10 PM  Date of Evaluation -  2020  Room and Bed Number -  0107/0107-01   Hospital Day - 2  Reason for ICU admission: Acute metabolic encephalopathy  CODE STATUS: Full code    SUBJECTIVE:     OVERNIGHT EVENTS:    No acute events reported. He had hemodialysis yesterday. TODAY: Still not following any commands and remains obtunded. Hemodynamically stable    Oliguric. Made 420 mL of urine in last 24 hours. Has had at least 4 loose/watery bowel motions in the last 24 hours. On lactulose 4 times daily via NG. BUN down to 93, bicarb improved from 18 to 21, potassium low 3.3, lactic acid stable 2.7.   transaminases and ALP trending down. Ammonia 105 this morning. Bilirubin stable at 40. WBC trended up slightly to 17.9. Platelets stable. Hemoglobin stable. INR stable 1.5.      AWAKE & FOLLOWING COMMANDS:  [x] No   [] Yes    SECRETIONS Amount:  [] Small [] Moderate  [] Large  [] None  Color:     [] White [] Colored  [] Bloody    SEDATION:  RAAS Score:  [] Propofol gtt  [] Versed gtt  [] Ativan gtt   [x] No Sedation    PARALYZED:  [x] No    [] Yes    VASOPRESSORS:  [x] No    [] Yes  [] Levophed [] Dopamine [] Vasopressin  [] Dobutamine [] Phenylephrine [] Epinephrine    Review of system. Unable to complete due to patient being obtunded.     OBJECTIVE:     VITAL SIGNS:  /62   Pulse 89   Temp 97.5 °F (36.4 °C) (Axillary)   Resp 21   Ht 5' 9\" (1.753 m)   Wt 167 lb 8.8 oz (76 kg)   SpO2 94%   BMI 24.74 kg/m²   Tmax over 24 hours:  Temp (24hrs), Av.6 °F (36.4 °C), Min:96.8 °F (36 °C), Max:98.1 °F (36.7 °C)      Patient Vitals for the past 8 hrs:   BP Temp Temp src Pulse Resp SpO2 Weight   20 0757 -- 97.5 °F (36.4 °C) Axillary -- -- -- --   20 0600 123/62 -- -- 89 21 94 % 167 lb 8.8 oz (76 kg)   20 0500 122/67 -- -- 85 21 95 % --   20 0400 123/73 98.1 °F (36.7 °C) Oral 87 22 97 % --   04/18/20 0300 125/72 -- -- 87 19 95 % --   04/18/20 0200 117/67 -- -- 91 27 95 % --   04/18/20 0100 133/73 -- -- 94 18 96 % --         Intake/Output Summary (Last 24 hours) at 4/18/2020 0821  Last data filed at 4/18/2020 0800  Gross per 24 hour   Intake 1080 ml   Output 450 ml   Net 630 ml     Date 04/18/20 0000 - 04/18/20 2359   Shift 5491-2531 2147-1567 9180-8545 24 Hour Total   INTAKE   I.V.(mL/kg) 995(13.1)   995(13.1)   NG/GT(mL/kg) 85(1.1)   85(1.1)   Shift Total(mL/kg) 5773(90.8)   5555(82.2)   OUTPUT   Urine(mL/kg/hr) 50(0.1) 30  80   Shift Total(mL/kg) 50(0.7) 30(0.4)  80(1.1)   Weight (kg) 76 76 76 76     Wt Readings from Last 3 Encounters:   04/18/20 167 lb 8.8 oz (76 kg)   04/16/20 186 lb (84.4 kg)   11/06/18 204 lb (92.5 kg)     Body mass index is 24.74 kg/m².         PHYSICAL EXAM:  GEN:  Confused, not responding to commands, NG tube in situ  EYES:  Deeply icteric+++, pupils equal, round, and reactive to light  HEENT:  Atramatic  LUNGS:  no increased work of breathing, good air exchange, clear to auscultation and no crackles or wheezing  CV:    normal apical impulse, regular rate and rhythm and normal S1 and S2  ABDOMEN:   normal bowel sounds, soft, non-distended and no masses palpated  :    Barlow catheter in situ  NEURO[de-identified]   Obtunded, not responding to any commands  SKIN:   No bruising or bleeding  EXTREMITIES:  No pedal or leg edema, no calf tenderness/swelling, no erythema, distal pulses intact       MEDICATIONS:  Scheduled Meds:   cefTRIAXone (ROCEPHIN) IV  2 g Intravenous Q24H    insulin lispro  0-6 Units Subcutaneous TID WC    insulin lispro  0-3 Units Subcutaneous Nightly    sodium chloride flush  10 mL Intravenous 2 times per day    lactulose  20 g Oral 4 times per day    pantoprazole  40 mg Intravenous BID    And    sodium chloride (PF)  10 mL Intravenous BID    levetiracetam  500 mg Intravenous Daily    pentoxifylline  400 mg Oral BID WC    sodium Date    WBC 17.9 04/18/2020    RBC 2.62 04/18/2020    HGB 8.1 04/18/2020    HCT 25.3 04/18/2020     04/18/2020    MCV 96.6 04/18/2020    MCH 30.9 04/18/2020    MCHC 32.0 04/18/2020    RDW 23.5 04/18/2020    NRBC 39 04/18/2020    LYMPHOPCT 9 04/18/2020    MONOPCT 3 04/18/2020    BASOPCT 0 04/18/2020    MONOSABS 0.54 04/18/2020    LYMPHSABS 1.61 04/18/2020    EOSABS 0.00 04/18/2020    BASOSABS 0.00 04/18/2020    DIFFTYPE NOT REPORTED 04/18/2020     CMP:    Lab Results   Component Value Date     04/18/2020    K 3.3 04/18/2020    CL 95 04/18/2020    CO2 21 04/18/2020    BUN 93 04/18/2020    CREATININE 5.37 04/18/2020    GFRAA 13 04/18/2020    LABGLOM 11 04/18/2020    GLUCOSE 125 04/18/2020    PROT 5.2 04/18/2020    LABALBU 2.2 04/18/2020    CALCIUM 8.0 04/18/2020    BILITOT 40.30 04/18/2020    ALKPHOS 496 04/18/2020     04/18/2020    ALT 99 04/18/2020         Radiology/Imaging:      ASSESSMENT:     Principal Problem:    Acute metabolic encephalopathy  Active Problems:    Essential hypertension    Hyperlipidemia    GERD (gastroesophageal reflux disease)    Jaundice    Liver failure (HCC)    Subdural hematoma (HCC)    Hepatic encephalopathy (HCC)    Alcoholic hepatitis    Alcoholic ketoacidosis  Resolved Problems:    * No resolved hospital problems. *           PLAN:     WEAN PER PROTOCOL:  [] No   [] Yes  [x] N/A    ICU PROPHYLAXIS:  Stress ulcer:  [x] PPI Agent  [] [id number] [] Sucralfate  [] Other:  VTE:   [] Enoxaparin  [] Unfract. Heparin Subcut  [] EPC Cuffs    NUTRITION:  [] NPO [x] Tube Feeding (Specify: ) [] TPN  [] PO    HOME MEDS RECONCILED: [] No  [] Yes    CONSULTATION NEEDED:  [x] No  [] Yes    FAMILY UPDATED:    [] No  [x] Yes    TRANSFER OUT OF ICU:   [x] No  [] Yes        Additional Assessment:  ·           Plan:  1. Acute metabolic encephalopathy. Due to decompensated liver cirrhosis. Continue lactulose, IV Rocephin 1 g daily. 2. Acute kidney injury . S/p HD x 1.   Nephrology bilirubin is 40 with direct bilirubin of greater than 27, his hemoglobin is 8.1 WBC count 17.9, his lactic acid is improved to 5.5 although is not hypotensive and most likely related to severe alcoholic hepatitis. He was started on pentoxifylline by GI although not able to take through the NG tube and he was started on rifaximin today along with lactulose. He will need intubation for airway protection. He is getting hemodialysis today per nephrology. We will follow-up lactic acid. We will follow-up hemoglobin hematocrit every 6 hours. Need neurosurgery follow-up as he is having worsening neuro status in case they want to repeat CT scan of the head. Started on midodrine by nephrology. Will give vitamin K and may need fresh frozen plasma. We will also give albumin 3 doses. We will need to discuss with GI about considering steroid for severe alcoholic hepatitis as his meld score is high and prognosis is poor. I was present during the process of intubation    Total critical care time caring for this patient with life threatening, unstable organ failure, including direct patient contact, management of life support systems, review of data including imaging and labs, discussions with other team members and physicians at least 27  Min so far today, excluding procedures. Please note that this chart was generated using voice recognition Dragon dictation software. Although every effort was made to ensure the accuracy of this automated transcription, some errors in transcription may have occurred.       Darell Stanford MD  4/18/2020 1:10 PM

## 2020-04-18 NOTE — PROGRESS NOTES
glycol, promethazine **OR** ondansetron  Home Meds:                Medications Prior to Admission: EPINEPHrine (EPIPEN) 0.3 MG/0.3ML SOAJ injection, INJECT INTRAMUSCULARLY AS DIRECTED  metoprolol tartrate (LOPRESSOR) 25 MG tablet, Take 1 tablet by mouth 2 times daily  amLODIPine (NORVASC) 10 MG tablet, Take 1 tablet by mouth daily  hydrochlorothiazide (HYDRODIURIL) 25 MG tablet, Take 1 tablet by mouth daily. simvastatin (ZOCOR) 20 MG tablet, Take 1 tablet by mouth nightly. omeprazole (PRILOSEC) 20 MG capsule, Take 1 capsule by mouth Daily. QUEtiapine (SEROQUEL) 100 MG tablet, Take 100 mg by mouth 2 times daily. citalopram (CELEXA) 20 MG tablet, Take 20 mg by mouth daily. sildenafil (VIAGRA) 50 MG tablet, Take 1 tablet by mouth as needed for Erectile Dysfunction. INVESTIGATIONS     Last 3 CMP:    Recent Labs     04/16/20  1157 04/16/20  2220 04/16/20  2353 04/17/20  0037 04/17/20  0546 04/17/20  1823 04/18/20  0435     --   --   --  142  --  141   K 3.5*  --   --   --  3.4*  --  3.3*   CL 91*  --   --   --  95*  --  95*   CO2 15*  --   --   --  18*  --  21   *  --   --   --  120*  --  93*   CREATININE 5.92*  --  7.50*  --  6.80*  --  5.37*   CALCIUM 10.0  --   --   --  8.8  --  8.0*   PROT 5.9* 5.6*  --  5.4*  --  5.3* 5.2*   LABALBU 2.9* 2.6*  --  2.5*  --   --  2.2*   BILITOT 46.44* 42.73*  --  41.40*  --   --  40.30*   ALKPHOS 570* 516*  --  506*  --   --  496*   * 473*  --  464*  --   --  394*   * 122*  --  117*  --   --  99*       Last 3 CBC:  Recent Labs     04/16/20  1157 04/17/20  0902 04/17/20  1616 04/17/20  2225 04/18/20  0435   WBC 14.5* 14.2*  --   --  17.9*   RBC 3.00* 2.48*  --   --  2.62*   HGB 9.3* 7.8* 8.0* 7.9* 8.1*   HCT 29.9* 24.2* 24.0* 24.3* 25.3*   MCV 99.7 97.6  --   --  96.6   MCH 31.0 31.5  --   --  30.9   MCHC 31.1 32.2  --   --  32.0   RDW 22.9* 22.6*  --   --  23.5*    210  --   --  241   MPV 11.2 11.0  --   --  12.2       ASSESSMENT     1.

## 2020-04-18 NOTE — PROCEDURES
Date: 4/18/2020  Time: 1353  Patient identity confirmed:  Yes  Indications: AW protection  Preoxygenation: yes    Laryngoscope size and type Glidescope  Airway introducer used: No  Evac: Yes  ETT size:an 8.0 cuffed  Number of attempts:2   Cords visualized:  [x] Clearly  [] Poorly  Breath sounds present bilaterally: Yes   ETCO2   [x] Positive   ETT secured at  24    ETT secured with Tim  Chest x-ray ordered: Yes         Was this a Code Situation:    No             BP: 125/60        Procedure performed by: Dr. Kamla Kellogg  2:00 PM                  Problem: OXYGENATION/RESPIRATORY FUNCTION  Goal: Patient will maintain patent airway  Outcome: Ongoing  Goal: Patient will achieve/maintain normal respiratory rate/effort  Respiratory rate and effort will be within normal limits for the patient   Outcome: Ongoing     Problem: MECHANICAL VENTILATION  Goal: Patient will maintain patent airway  Outcome: Ongoing  Goal: Oral health is maintained or improved  Outcome: Ongoing  Goal: ET tube will be managed safely  Outcome: Ongoing  Goal: Ability to express needs and understand communication  Outcome: Ongoing  Goal: Mobility/activity is maintained at optimum level for patient  Outcome: Ongoing     Problem: ASPIRATION PRECAUTIONS  Goal: Patients risk of aspiration is minimized  Outcome: Ongoing     Problem: SKIN INTEGRITY  Goal: Skin integrity is maintained or improved  Outcome: Ongoing    The tube was secured with an endotracheal tube ledezma. The endotracheal tube was moved and the skin assessed. Skin assessment revealed no problems. Endotracheal tube was taped at the  24 cm kimberly. Endotracheal tube ledezma was applied on April 18. Action steps for any skin breakdown: 0    Shaw Joe  2:00 PM  Patient admitted on Mechanical Ventilator Protocol. Patients height measured at 65 inches  for an IBW 61.5kg       Patient placed on the ventilator on settings as charted on flowsheeet.          Ventilator

## 2020-04-18 NOTE — PROGRESS NOTES
limits measuring 0.4 cm. RIGHT KIDNEY: No gross evidence of hydronephrosis. See separate renal report for further details. PANCREAS:  Visualized portions of the pancreas are unremarkable. OTHER: Trace ascites. Coarsened echotexture of the liver suggesting underlying hepatocellular disease such as cirrhosis. ENDOSCOPY     Principal Problem:    Acute metabolic encephalopathy  Active Problems:    Essential hypertension    Hyperlipidemia    GERD (gastroesophageal reflux disease)    Jaundice    Liver failure (HCC)    Subdural hematoma (HCC)    Hepatic encephalopathy (HCC)    Alcoholic hepatitis    Alcoholic ketoacidosis  Resolved Problems:    * No resolved hospital problems. *       GI Assessment:  1. Acute on chronic liver failure: Secondary to alcoholic hepatitis. Maddrey discriminant score today 56.  2. Acute metabolic Encephalopathy: Hepatic Vs UTI Vs alcohol withdrawal.  3. Hepatomegaly with hepatojugular reflux. 4. Acute renal failure likely secondary to rhabdomyolysis Vs ischemic ATN. On dialysis and bicarb infusion. 5. Acute blood loss anemia: Stable at 8. NG tube with blood stain, could be NG trauma. Will monitor. 6. Acute SDH secondary to fall: Neurosurgery evaluated. No surgical intervention now. 7. UTI: On antibiotics  8. Alcohol use disorder    Plan of care:  1. Trend LFTs, CBC, PT/INR daily. 2. Monitor H/H, signs of bleeding. 3. Continue lactulose and Pentoxifylline 400 mg BID as renal dosing. 4. Will add Rifaximin 550 mg BID  5. Close neurovascular checks, avoid sedatives, hepatotoxins. Plan formulated after discussing with Dr. Zeinab Brewster. Please reach out with any questions or concerns.       Cleve Malone MD, PGY-1  [facility]  [address] [place] [address]  4/18/2020 11:02 AM  Attending Physician Statement  I have discussed the care of Tom Bernal and   I have examined the patient myselft independently, and taken ros and hpi , including pertinent history and exam findings,  with the author of this note . I have reviewed the key elements of all parts of the encounter with the nurse practitioner/resident.     I agree with the assessment, plan and orders as documented by the above health care provider       Patient is getting intubated at the moment because of respiratory issues and mental status depression  Although his ammonia is better today    Recommend an NG tube  With lactulose and Xifaxan  Watch for withdrawal  No evidence of active bleeding at this time we will continue to watch  The patient had leukocytosis he is diagnosed with UTI blood cultures are negative so far chest x-ray is being followed by the other team biotics  Elevated liver enzymes improving slightly related to alcohol hepatitis with a DF of 69 patient is already on point the pentoxifylline      Electronically signed by Mone Correa MD

## 2020-04-18 NOTE — PROCEDURES
Intubation Procedure Note    Indication: Respiratory failure    Consent: Unable to be obtained due to the emergent nature of this procedure. Medications Used: , etomidate intravenously and rocuronium intravenously    Procedure: The patient was placed in the appropriate position. In line stabilization was not required. Intubation was performed by direct laryngoscopy using a laryngoscope and a 7.5 cuffed endotracheal tube. The cuff was then inflated and the tube was secured appropriately at a distance of 25 cm to the dental ridge. Initial confirmation of placement included bilateral breath sounds, an end tidal CO2 detector, absence of sounds over the stomach, tube fogging and adequate chest rise. A chest x-ray to verify correct placement of the tube showed appropriate tube position. The patient tolerated the procedure well. Complications: None    I was present and supervise the procedure.   No immediate complications    Tito Castaneda MD4/18/20208:19 PM

## 2020-04-18 NOTE — PROGRESS NOTES
Called to evaluate patient at approx 5pm. Patient noted to have bright red output from NG, approx 200cc. Discussed w/ resident that intubated patient, who reported that prior to intubation patient was noted to have bright red blood in his oropharynx that required suctioning. Intubation was atraumatic. Patient also hypotensive BP 85/56. Patient w/ alcoholic hepatitis, concerns for possible esophageal varices. Octreotide and protonix bolus and drips ordered. Patient is already on Rocephin. Discussed via perfect serve w/ GI physician, believes bleeding may be related to intubation but agrees w/ orders. Requesting repeat PT INR. Stat H/H also ordered, H/H q6h. Patient given 500cc bolus with improvement in his blood pressures. If needed will start on pressors. Patient with only one working peripheral IV, central line and arterial lines placed. Hgb 7.6 (8.1 this AM)  INR 1.6 (1.5 in AM)  Lactate 5.5 (5.5 this AM)    Will continue to closely monitor. Cinda Gifford DO   Critical Care Service  Emergency Medicine Resident PGY3  4/18/20 8:11 PM EDT .

## 2020-04-18 NOTE — CARE COORDINATION
Case Management Initial Discharge Plan  William Cabrales,           Patient unable to answer questions karena to altered mental status. Met with: called and spoke with spouse/SO to discuss discharge plans. Information verified: address, contacts, phone number, , insurance Yes  PCP: Paula Bourne MD  Date of last visit: 2020    Insurance Provider: Medicaid    Discharge Planning    Living Arrangements:  Spouse/Significant Other   Support Systems:  Spouse/Significant Other, Family Members    Home has 1 story  2 stairs to climb to get into front door,  Location of bedroom/bathroom in home main    Patient able to perform ADL's:Independent    Current Services (outpatient & in home) none  DME equipment: cane,walker  DME provider:       Potential Assistance Needed:  1 Parmjit Cummings    Patient agreeable to home care: No  [place] of choice provided:  n/a    Prior SNF/Rehab Placement and Facility: none  Agreeable to SNF/Rehab: No  [place] of choice provided: n/a   Evaluation: n/a    Expected Discharge date:  20  Patient expects to be discharged to:  Home  Follow Up Appointment: Best Day/ Time:      Transportation provider: francisco OGDEN if going home  Transportation arrangements needed for discharge: Yes    Readmission Risk              Risk of Unplanned Readmission:        31             Does patient have a readmission risk score greater than 14?: Yes  If yes, follow-up appointment must be made within 7 days of discharge. Goals of Care: return to prior level of function      Discharge Plan: Goal home with wife. May need home care or SNF depending on progress and needs.           Electronically signed by George Staples RN on 20 at 2:39 PM EDT

## 2020-04-18 NOTE — PROGRESS NOTES
mL IVPB, , Intravenous, Q24H  ASSESSMENT AND PLAN    60year old with acute renal failure and severe liver dysfunction with stable left frontal subdural hematoma    No surgical indications at this point  Check repeat CT head post intubation, if stable ok for neurochecks q4 hours  7 days of AEDs  Continue HD and lactulose    Electronically signed by Karoline Samuel DO on 4/18/2020 at 3:19 PM

## 2020-04-19 NOTE — FLOWSHEET NOTE
Neha's wife Syl Green has been updated today by critical care,Dr. John Church, and neuro Dr. Lawrence Coles, and I updated her again at 1800 with general updates.

## 2020-04-19 NOTE — PROCEDURES
well.     Complications: None    Bruce Bishop DO   Critical Care Service  Emergency Medicine Resident PGY3  4/18/20 8:17 PM EDT

## 2020-04-19 NOTE — PROGRESS NOTES
pupils equal, round, and reactive to light  HEENT:  Atramatic  LUNGS:  no increased work of breathing, good air exchange, clear to auscultation and no crackles or wheezing  CV:    normal apical impulse, regular rate and rhythm and normal S1 and S2  ABDOMEN:   normal bowel sounds, soft, non-distended and no masses palpated  :    Barlow catheter in situ  NEURO[de-identified]   Obtunded, not responding to any commands  SKIN:   No bruising or bleeding  EXTREMITIES:  No pedal or leg edema, no calf tenderness/swelling, no erythema, distal pulses intact       MEDICATIONS:  Scheduled Meds:   magnesium sulfate  1 g Intravenous Q1H    compounded oral suspension   Per G Tube BID WC    midodrine  5 mg Oral TID    rifaximin  550 mg Oral BID    albumin human  12.5 g Intravenous Q8H    cefTRIAXone (ROCEPHIN) IV  2 g Intravenous Q24H    insulin lispro  0-6 Units Subcutaneous TID WC    insulin lispro  0-3 Units Subcutaneous Nightly    sodium chloride flush  10 mL Intravenous 2 times per day    lactulose  20 g Oral 4 times per day    levetiracetam  500 mg Intravenous Daily    thiamine and folic acid IVPB   Intravenous Q24H     Continuous Infusions:   norepinephrine 6 mcg/min (04/19/20 0643)    sodium chloride 100 mL/hr at 04/18/20 1914    pantoprozole (PROTONIX) infusion 8 mg/hr (04/19/20 0426)    octreotide (SANDOSTATIN) infusion 50 mcg/hr (04/19/20 0426)    propofol 10 mcg/kg/min (04/19/20 0823)    dextrose       PRN Meds:   fentanNYL, 50 mcg, Q1H PRN  glucose, 15 g, PRN  dextrose, 12.5 g, PRN  glucagon (rDNA), 1 mg, PRN  dextrose, 100 mL/hr, PRN  sodium chloride flush, 10 mL, PRN  sodium chloride, 250 mL, PRN  sodium chloride, 150 mL, PRN  heparin (porcine), 1,300 Units, PRN  heparin (porcine), 1,400 Units, PRN  sodium chloride flush, 10 mL, PRN  polyethylene glycol, 17 g, Daily PRN  promethazine, 12.5 mg, Q6H PRN    Or  ondansetron, 4 mg, Q6H PRN        SUPPORT DEVICES: [] Ventilator [] BIPAP  [] Nasal Cannula [x] Room Air    VENT SETTINGS (Comprehensive) (if applicable):    Vent Information  $Ventilation: $Subsequent Day  Vent Type: Servo i  Vent Mode: PRVC  Vt Ordered: 490 mL  Rate Set: 16 bmp  FiO2 : 40 %  SpO2: 94 %  SpO2/FiO2 ratio: 235  Sensitivity: 5  PEEP/CPAP: 8  I Time/ I Time %: 0.9 s  Humidification Source: HME  Additional Respiratory  Assessments  Pulse: 85  Resp: 28  SpO2: 94 %  End Tidal CO2: 24 (%)  pCO2 (TCOM, mmHg): 31 mmHg  Position: Semi-Prater's  Humidification Source: E  Oral Care Completed?: Yes  Oral Care: Teeth brushed, Mouth swabbed  Subglottic Suction Done?: Yes    ABGs:     Lab Results   Component Value Date    SES6NNZ 21 04/19/2020    FIO2 50.0 04/19/2020         DATA:  Complete Blood Count:   Recent Labs     04/17/20  0902  04/18/20  0435  04/18/20  1906 04/19/20  0006 04/19/20  0517   WBC 14.2*  --  17.9*  --   --   --  20.5*   RBC 2.48*  --  2.62*  --   --   --  2.27*   HGB 7.8*   < > 8.1*   < > 7.6* 7.4* 7.2*   HCT 24.2*   < > 25.3*   < > 23.5* 22.8* 22.8*   MCV 97.6  --  96.6  --   --   --  100.4   MCH 31.5  --  30.9  --   --   --  31.7   MCHC 32.2  --  32.0  --   --   --  31.6   RDW 22.6*  --  23.5*  --   --   --  24.8*     --  241  --   --   --  209   MPV 11.0  --  12.2  --   --   --  12.0    < > = values in this interval not displayed.         Last 3 Blood Glucose:   Recent Labs     04/16/20  1157 04/17/20  0546 04/18/20  0435 04/19/20  0517   GLUCOSE 76 96 125* 128*        PT/INR:    Lab Results   Component Value Date    PROTIME 17.0 04/19/2020    INR 1.7 04/19/2020     PTT:    Lab Results   Component Value Date    APTT 51.2 04/16/2020       Comprehensive Metabolic Profile:   Recent Labs     04/17/20  0037 04/17/20  0546 04/17/20  1823 04/18/20  0435 04/19/20  0517   NA  --  142  --  141 136   K  --  3.4*  --  3.3* 3.6*   CL  --  95*  --  95* 96*   CO2  --  18*  --  21 15*   BUN  --  120*  --  93* 55*   CREATININE  --  6.80*  --  5.37* 4.87*   GLUCOSE  --  96  --  125* 128*   CALCIUM MONOSABS 0.62 04/19/2020    LYMPHSABS 1.03 04/19/2020    EOSABS 0.00 04/19/2020    BASOSABS 0.00 04/19/2020    DIFFTYPE NOT REPORTED 04/19/2020     CMP:    Lab Results   Component Value Date     04/19/2020    K 3.6 04/19/2020    CL 96 04/19/2020    CO2 15 04/19/2020    BUN 55 04/19/2020    CREATININE 4.87 04/19/2020    GFRAA 15 04/19/2020    LABGLOM 12 04/19/2020    GLUCOSE 128 04/19/2020    PROT 5.1 04/19/2020    LABALBU 2.2 04/19/2020    CALCIUM 7.4 04/19/2020    BILITOT 34.01 04/19/2020    ALKPHOS 412 04/19/2020     04/19/2020    ALT 90 04/19/2020         Radiology/Imaging:      ASSESSMENT:     Principal Problem:    Acute metabolic encephalopathy  Active Problems:    Essential hypertension    Hyperlipidemia    GERD (gastroesophageal reflux disease)    Jaundice    Liver failure (HCC)    Acute on chronic intracranial subdural hematoma (HCC)    Hepatic encephalopathy (HCC)    Alcoholic hepatitis    Alcoholic ketoacidosis    MARYLIN (acute kidney injury) ([facility] [address]  Resolved Problems:    * No resolved hospital problems. *           PLAN:     WEAN PER PROTOCOL:  [] No   [] Yes  [x] N/A    ICU PROPHYLAXIS:  Stress ulcer:  [x] PPI Agent  [] F2Gqajc [] Sucralfate  [] Other:  VTE:   [] Enoxaparin  [] Unfract. Heparin Subcut  [] EPC Cuffs    NUTRITION:  [] NPO [x] Tube Feeding (Specify: ) [] TPN  [] PO    HOME MEDS RECONCILED: [] No  [] Yes    CONSULTATION NEEDED:  [x] No  [] Yes    FAMILY UPDATED:    [] No  [x] Yes    TRANSFER OUT OF ICU:   [x] No  [] Yes        Additional Assessment:  ·           Plan:  1. Acute metabolic encephalopathy. Due to decompensated liver cirrhosis. Continue lactulose, IV Rocephin 1 g daily. 2. Acute kidney injury . S/p HD x 1. Nephrology following. Hemodialysis today. Continue monitor BMP daily. 3. Right lower lobe pneumonia, will start antibiotics likely aspiration versus community-acquired. 4. Decompensated liver cirrhosis. MELD score 38. Continue lactulose.   GI

## 2020-04-19 NOTE — PROGRESS NOTES
Liver    Addendum Date: 4/17/2020    ADDENDUM: The liver measures 23 cm in length. Result Date: 4/17/2020  EXAMINATION: RIGHT UPPER QUADRANT ULTRASOUND 4/17/2020 7:47 am COMPARISON: None. HISTORY: ORDERING SYSTEM PROVIDED HISTORY: cirrhosis evaluation TECHNOLOGIST PROVIDED HISTORY: cirrhosis evaluation FINDINGS: LIVER:  Coarsened echotexture of the liver. No focal mass. Minimal undulating contours of the liver. No biliary ductal dilatation. Appropriate hepatopetal flow in the portal vein. BILIARY SYSTEM:  Biliary sludge versus reverberation artifact in the dependent portion of the gallbladder. Common bile duct is within normal limits measuring 0.4 cm. RIGHT KIDNEY: No gross evidence of hydronephrosis. See separate renal report for further details. PANCREAS:  Visualized portions of the pancreas are unremarkable. OTHER: Trace ascites. Coarsened echotexture of the liver suggesting underlying hepatocellular disease such as cirrhosis. ENDOSCOPY     Principal Problem:    Acute metabolic encephalopathy  Active Problems:    Essential hypertension    Hyperlipidemia    GERD (gastroesophageal reflux disease)    Jaundice    Liver failure (HCC)    Acute on chronic intracranial subdural hematoma (HCC)    Hepatic encephalopathy (HCC)    Alcoholic hepatitis    Alcoholic ketoacidosis    MARYLIN (acute kidney injury) ([address]  Resolved Problems:    * No resolved hospital problems. *       GI Assessment:    1. Acute liver failure secondary to severe alcoholic hepatitis/cirrhosis - -DF 69 on admission  -Elevated INR - on Vitamin K  - LFTs improving on pentoxifylline  2. AMS with hepatic encephalopathy - Required intubation 04/18/2020  3. Severe alcohol use disorder  4. Left frontal subdural hemorrhage - CT showing midline shift  5. MARYLIN secondary to ischemic ATN and urinary retention-on hemodialysis      Plan of care:  1. Monitor hemoglobin and hematocrit. Observe for any signs of GI bleeding  2.  Continue lactulose and

## 2020-04-19 NOTE — PROGRESS NOTES
injection 1,400 Units, 1,400 Units, Intracatheter, PRN  sodium chloride flush 0.9 % injection 10 mL, 10 mL, Intravenous, 2 times per day  sodium chloride flush 0.9 % injection 10 mL, 10 mL, Intravenous, PRN  polyethylene glycol (GLYCOLAX) packet 17 g, 17 g, Oral, Daily PRN  promethazine (PHENERGAN) tablet 12.5 mg, 12.5 mg, Oral, Q6H PRN **OR** ondansetron (ZOFRAN) injection 4 mg, 4 mg, Intravenous, A6W PRN  folic acid 1 mg, thiamine (B-1) 100 mg in dextrose 5 % 50 mL IVPB, , Intravenous, Q24H    PHYSICAL EXAM:       /61   Pulse 93   Temp 98.1 °F (36.7 °C) (Axillary)   Resp 29   Ht 5' 9\" (1.753 m)   Wt 169 lb 8.5 oz (76.9 kg)   SpO2 100%   BMI 25.04 kg/m²       CONSTITUTIONAL:  intubated   HEAD:  normocephalic, atraumatic    EYES:  PERRLA, scleral icterus   ENT:  BRB from OG   NECK:  supple       LUNGS:  Equal air entry bilaterally, intubated   CARDIOVASCULAR:  normal s1 / s2   ABDOMEN:  distended   NEUROLOGIC:  Local withdrawal from noxious stimuli in all extremities.  BL PERRLA   SKIN:  no rash      LABS AND IMAGING:     CBC with Differential:    Lab Results   Component Value Date    WBC PENDING 04/19/2020    RBC 2.27 04/19/2020    HGB 7.2 04/19/2020    HCT 22.8 04/19/2020     04/19/2020    .4 04/19/2020    MCH 31.7 04/19/2020    MCHC 31.6 04/19/2020    RDW 24.8 04/19/2020    NRBC 39 04/18/2020    LYMPHOPCT PENDING 04/19/2020    MONOPCT PENDING 04/19/2020    BASOPCT PENDING 04/19/2020    MONOSABS PENDING 04/19/2020    LYMPHSABS PENDING 04/19/2020    EOSABS PENDING 04/19/2020    BASOSABS PENDING 04/19/2020    DIFFTYPE NOT REPORTED 04/19/2020     BMP:    Lab Results   Component Value Date     04/19/2020    K 3.6 04/19/2020    CL 96 04/19/2020    CO2 15 04/19/2020    BUN 55 04/19/2020    LABALBU 2.2 04/19/2020    CREATININE PENDING 04/19/2020    CALCIUM 7.4 04/19/2020    GFRAA PENDING 04/19/2020    LABGLOM PENDING 04/19/2020    GLUCOSE 128 04/19/2020       Radiology Review:    CT HEAD caregiver. XR CHEST PORTABLE   Final Result   Mild bilateral perihilar atelectasis. Otherwise negative chest x-ray. XR CHEST PORTABLE    (Results Pending)   CT HEAD WO CONTRAST    (Results Pending)       ASSESSMENT AND PLAN:       Patient Active Problem List   Diagnosis    Essential hypertension    Hyperlipidemia    GERD (gastroesophageal reflux disease)    Depression    Osteoarthritis    Tracheostomy status (HCC)    Jaundice    Liver failure (HCC)    Acute metabolic encephalopathy    Acute on chronic intracranial subdural hematoma (HCC)    Hepatic encephalopathy (HCC)    Alcoholic hepatitis    Alcoholic ketoacidosis    MARYLIN (acute kidney injury) (HCC)       A/P:  This is a 60 y.o. male with left frontal SDH. Patient care will be discussed with attending, will reevaluate patient along with attending     - No neurosurgical interventions planned for now  - New 3mm rightward shift at septum pellucidum on CT head 4/18, stable neuro exam  - Give 1u FFP. Consider DDAVP. 10mg Vit K given by MICU team   - Obtain repeat CT head in AM 4/19 0900   - Neuro checks per protocol   - continue Keppra, 7 days total   - continue HD and lactulose    Additional recommendations may follow    Please contact neurosurgery with any changes in patients neurologic status. Thank you for your consult. Lcuie Rosenberg DO    4/19/2020  6:14 AM      Neurosurgery Attending:    I have reviewed the chart and examined the patient and agree with the resident except the following:    Patient has episode of bleeding from NG. He is getting blood transfusion currently. CT head done yesterday 9pm showed more bleeding collecting on floor of left frontal lobe. Due to that, FFP was transfused. He is also received Vitamin K.  A repeat CT head today continued to showed increased bleeding on the anterior cranial floor on the left. ( I discussed with the radiologist).  There is no cerebral edema, there is very mild midline shift and his open cisterns. His neuro exam is grossly unchanged from the first time I met him morning of 4/17. His GCS is E2, V1T M 4-5= 7-8T. Pupils brisk and small. I'm concerned that he continues to slowly bleed in the subdural space, though it is not life threatening yet. His liver function is poor and there is some coagulopathy but I'm concerned the he may have uremic bleeding as well. This is not yet surgical however and should not require surgery in the acute setting if he stops bleeding and he should.      I'm do not see any cerebral edema    I spoke with Romana Laity and we will try DDAVP x1 for now at half dose and monitor for hyponatremia    I will also start him tranexamic acid 5mg/kg daily    I will also update the family    Approximately 50 mins spent on critical care of this pt including coordination of care    Electronically signed by Jeovany Hameed DO on 4/19/2020 at 1:37 PM

## 2020-04-19 NOTE — PROGRESS NOTES
infusion 50 mcg/hr (04/19/20 0426)    propofol 8 mcg/kg/min (04/19/20 0953)    dextrose       PRN Meds:  fentanNYL, glucose, dextrose, glucagon (rDNA), dextrose, sodium chloride flush, sodium chloride, sodium chloride, heparin (porcine), heparin (porcine), sodium chloride flush, polyethylene glycol, promethazine **OR** ondansetron  Home Meds:                Medications Prior to Admission: EPINEPHrine (EPIPEN) 0.3 MG/0.3ML SOAJ injection, INJECT INTRAMUSCULARLY AS DIRECTED  metoprolol tartrate (LOPRESSOR) 25 MG tablet, Take 1 tablet by mouth 2 times daily  amLODIPine (NORVASC) 10 MG tablet, Take 1 tablet by mouth daily  hydrochlorothiazide (HYDRODIURIL) 25 MG tablet, Take 1 tablet by mouth daily. simvastatin (ZOCOR) 20 MG tablet, Take 1 tablet by mouth nightly. omeprazole (PRILOSEC) 20 MG capsule, Take 1 capsule by mouth Daily. QUEtiapine (SEROQUEL) 100 MG tablet, Take 100 mg by mouth 2 times daily. citalopram (CELEXA) 20 MG tablet, Take 20 mg by mouth daily. sildenafil (VIAGRA) 50 MG tablet, Take 1 tablet by mouth as needed for Erectile Dysfunction.     INVESTIGATIONS     Last 3 CMP:    Recent Labs     04/17/20  0037 04/17/20  0546 04/17/20  1823 04/18/20  0435 04/19/20  0517   NA  --  142  --  141 136   K  --  3.4*  --  3.3* 3.6*   CL  --  95*  --  95* 96*   CO2  --  18*  --  21 15*   BUN  --  120*  --  93* 55*   CREATININE  --  6.80*  --  5.37* 4.87*   CALCIUM  --  8.8  --  8.0* 7.4*   PROT 5.4*  --  5.3* 5.2* 5.1*   LABALBU 2.5*  --   --  2.2* 2.2*   BILITOT 41.40*  --   --  40.30* 34.01*   ALKPHOS 506*  --   --  496* 412*   *  --   --  394* 357*   *  --   --  99* 90*       Last 3 CBC:  Recent Labs     04/17/20  0902  04/18/20  0435  04/18/20  1906 04/19/20  0006 04/19/20  0517   WBC 14.2*  --  17.9*  --   --   --  20.5*   RBC 2.48*  --  2.62*  --   --   --  2.27*   HGB 7.8*   < > 8.1*   < > 7.6* 7.4* 7.2*   HCT 24.2*   < > 25.3*   < > 23.5* 22.8* 22.8*   MCV 97.6  --  96.6  --   --   -- 100.4   MCH 31.5  --  30.9  --   --   --  31.7   MCHC 32.2  --  32.0  --   --   --  31.6   RDW 22.6*  --  23.5*  --   --   --  24.8*     --  241  --   --   --  209   MPV 11.0  --  12.2  --   --   --  12.0    < > = values in this interval not displayed. ASSESSMENT     1. Acute kidney injury secondary to ischemic ATN (Acute hepatic injury/HRS 1) complicated by urine retention with 200 cc of urine drained after Barlow insertion -hemodialysis dependent since 17th April. Creatinine peaked to 6.8  Baseline creatinine 1.2 till 2018. No labs since then. 2.  Acute hepatitis/fulminant hepatic failure suspected alcoholic  3. Subdural hematoma status post fall with midline shift  4. Encephalopathy -multifactorial -hepatic/renal injury/subdural hematoma  5. Ventilatory dependent respiratory failure  6. Anemia  7. Hypotension on pressors    PLAN     1. Dialysis be planned tomorrow in all probability he will need CRRT in view of midline shift vs conventional HD with low Qb  2. Switch to bicarb containing fluids  3. Avoid nephrotoxins  4.   Prognosis guarded    Please do not hesitate to call with questions    This note is created with the assistance of a speech-recognition program. While intending to generate a document that actually reflects the content of the visit, no guarantees can be provided that every mistake has been identified and corrected by editing    Bella Hung MD, MRCP [facility]   4/19/2020 10:31 AM  [facility]

## 2020-04-19 NOTE — PROGRESS NOTES
INTENSIVE CARE UNIT  Resident Physician Progress Note    Patient Rachel Herbert  Date of Admission -  4/16/2020  3:10 PM  Date of Evaluation -  4/19/2020  Room and Bed Number -  0107/0107-01   Hospital Day - 3  Reason for ICU admission: Acute metabolic encephalopathy  CODE STATUS: Full code    SUBJECTIVE:     OVERNIGHT EVENTS:   Patient had 200 cc of bright red blood out of NG tube yesterday, however amount of bright red secretions has decreased. Patient has minimal dark red appearing secretions currently in canister. Patient had dialysis yesterday which removed 0.5 kg, had to be stopped secondary to hypotension and fluid bolus was started. Patient CT head was concerning for 3 mm shift, 10 mg IV vitamin K was started and neurosurgery is following. Neurosurgery recommends 7 days of antiepileptic drugs, we will touch base to ensure which dose they prefer. Patient is currently on Levophed for blood pressure support and propofol for sedation. Patient will get repeat CT head this morning. Per nursing staff patient is reaching for ET tube when sedation is weaned. Patient did not undergo a spontaneous breathing trial this morning as he is going for a CT head. Patient's leukocytosis is increasing, he also shows a new right lower lobe infiltrate possibly pneumonia. Patient has no positive results of blood cultures thus far. Likely a sputum culture will be ordered today. TODAY:   Patient is an uric and on hemodialysis. Patient had 1.3 L removed from hemodialysis yesterday a.m.      AWAKE & FOLLOWING COMMANDS:  [x] No   [] Yes    SECRETIONS Amount:  [] Small [] Moderate  [] Large  [] None  Color:     [] White [] Colored  [x] Bloody    SEDATION:  RAAS Score:  [] Propofol gtt  [] Versed gtt  [] Ativan gtt   [x] No Sedation    PARALYZED:  [x] No    [] Yes    VASOPRESSORS:  [x] No    [] Yes  [x] Levophed [] Dopamine [] Vasopressin  [] Dobutamine [] Phenylephrine [] Epinephrine    Review of system.   Unable to complete due to patient being obtunded. OBJECTIVE:     VITAL SIGNS:  /64   Pulse 85   Temp 98.6 °F (37 °C) (Oral)   Resp 28   Ht 5' 9\" (1.753 m)   Wt 169 lb 12.1 oz (77 kg)   SpO2 94%   BMI 25.07 kg/m²   Tmax over 24 hours:  Temp (24hrs), Av.3 °F (36.8 °C), Min:97.5 °F (36.4 °C), Max:98.7 °F (37.1 °C)      Patient Vitals for the past 8 hrs:   BP Temp Temp src Pulse Resp SpO2 Weight   20 0803 -- -- -- -- 28 94 % --   20 0800 -- 98.6 °F (37 °C) Oral -- -- -- --   20 0700 122/64 -- -- 85 21 100 % --   20 0645 129/68 -- -- 87 26 100 % --   20 0630 132/64 -- -- 89 28 100 % --   20 0615 (!) 112/59 98.6 °F (37 °C) Axillary 91 30 100 % --   20 0600 122/61 -- -- 93 29 100 % 169 lb 12.1 oz (77 kg)   20 0500 134/68 -- -- 90 22 100 % --   20 0400 (!) 104/59 98.1 °F (36.7 °C) Axillary 84 22 100 % --   20 0309 -- -- -- 90 26 100 % --   20 0300 112/60 -- -- 82 26 100 % --   20 0200 (!) 92/55 -- -- 92 28 100 % --   20 0100 (!) 98/58 -- -- 93 30 100 % --         Intake/Output Summary (Last 24 hours) at 2020 0846  Last data filed at 2020 0805  Gross per 24 hour   Intake 4241.1 ml   Output 1930 ml   Net 2311.1 ml     Date 20 0000 - 20 2359   Shift 5309-2965 9738-3042 1570-4241 24 Hour Total   INTAKE   I.V.(mL/kg) 8895(57.3) 210.1(2.7)  1586. 1(20.6)   Blood(mL/kg) 373(4.8)   373(4.8)   NG/GT(mL/kg) 75(1)   75(1)   IV Piggyback(mL/kg) 309(4)   309(4)   Shift Total(mL/kg) 9444(23.5) 210.1(2.7)  2343. 1(30.4)   OUTPUT   Urine(mL/kg/hr) 20(0)   20   Stool(mL/kg) 550(7.1)   550(7.1)   Shift Total(mL/kg) 570(7.4)   570(7.4)   Weight (kg) 77 77 77 77     Wt Readings from Last 3 Encounters:   20 169 lb 12.1 oz (77 kg)   20 186 lb (84.4 kg)   18 204 lb (92.5 kg)     Body mass index is 25.07 kg/m².         PHYSICAL EXAM:  GEN:  Confused, not responding to commands, NG tube in situ  EYES:  Deeply icteric+++, Air    VENT SETTINGS (Comprehensive) (if applicable):    Vent Information  $Ventilation: $Subsequent Day  Vent Type: Servo i  Vent Mode: PRVC  Vt Ordered: 490 mL  Rate Set: 16 bmp  FiO2 : 40 %  SpO2: 94 %  SpO2/FiO2 ratio: 235  Sensitivity: 5  PEEP/CPAP: 8  I Time/ I Time %: 0.9 s  Humidification Source: HME  Additional Respiratory  Assessments  Pulse: 85  Resp: 28  SpO2: 94 %  End Tidal CO2: 24 (%)  pCO2 (TCOM, mmHg): 31 mmHg  Position: Semi-Prater's  Humidification Source: E  Oral Care Completed?: Yes  Oral Care: Teeth brushed, Mouth swabbed  Subglottic Suction Done?: Yes    ABGs:     Lab Results   Component Value Date    XDA8EJJ 21 04/19/2020    FIO2 50.0 04/19/2020         DATA:  Complete Blood Count:   Recent Labs     04/17/20  0902  04/18/20  0435  04/18/20  1906 04/19/20  0006 04/19/20  0517   WBC 14.2*  --  17.9*  --   --   --  20.5*   RBC 2.48*  --  2.62*  --   --   --  2.27*   HGB 7.8*   < > 8.1*   < > 7.6* 7.4* 7.2*   HCT 24.2*   < > 25.3*   < > 23.5* 22.8* 22.8*   MCV 97.6  --  96.6  --   --   --  100.4   MCH 31.5  --  30.9  --   --   --  31.7   MCHC 32.2  --  32.0  --   --   --  31.6   RDW 22.6*  --  23.5*  --   --   --  24.8*     --  241  --   --   --  209   MPV 11.0  --  12.2  --   --   --  12.0    < > = values in this interval not displayed.         Last 3 Blood Glucose:   Recent Labs     04/16/20  1157 04/17/20  0546 04/18/20  0435 04/19/20  0517   GLUCOSE 76 96 125* 128*        PT/INR:    Lab Results   Component Value Date    PROTIME 17.0 04/19/2020    INR 1.7 04/19/2020     PTT:    Lab Results   Component Value Date    APTT 51.2 04/16/2020       Comprehensive Metabolic Profile:   Recent Labs     04/17/20  0037 04/17/20  0546 04/17/20  1823 04/18/20  0435 04/19/20  0517   NA  --  142  --  141 136   K  --  3.4*  --  3.3* 3.6*   CL  --  95*  --  95* 96*   CO2  --  18*  --  21 15*   BUN  --  120*  --  93* 55*   CREATININE  --  6.80*  --  5.37* 4.87*   GLUCOSE  --  96  --  125* 128*   CALCIUM follow. 5. Alcoholic hepatitis. High Maddrey DF score. Started on pentoxifylline but unable to get it due to difficulty with administering via NG. Will discuss with GI about switching to steroids in the interim. 6. High anion gap metabolic acidosis. 7. Alcohol use disorder. Will  on quitting when mental status allows. 8. Prediabetes. Gluicose currently stable. Continue monitor glucose. 9. Subdural hematoma. Continue hold anticoagulation. 1. CT head pending, repeat imaging this morning. .  Neurosurgery following, recommend AEDs  10. EPC cuffs for DVT prophylaxis. 11. PT/OT          Radha Miss   PGY-2 Resident  Emergency Medicine   [facility]  4/19/2020 8:46 AM     Attending Physician Statement  I have discussed the care of Carlos Jacob, including pertinent history and exam findings with the resident. I have reviewed the key elements of all parts of the encounter with the resident. I have seen and examined the patient with the resident. I agree with the assessment and plan and status of the problem list as documented. Seen the patient events from yesterday overnight, chest x-ray is labs ventilator setting arterial blood gases seen. Overnight he had evidence of oral nasal bleeding likely possible GI bleeding NG tube has bloody secretions but mostly from oral although there was no recognized source of bleeding he was given vitamin K IV and also he was given 1 unit of fresh frozen plasma by neurosurgery. He was started on PPI drip and also on octreotide drip because of concern of GI bleeding and discussed with GI. He has not had worsening renal failure urine output is low, lactic acid remains elevated 5.5. He had a repeat CT scan of the head done which shows increasing size of subdural hematoma and 3 mm midline shift seen by neurosurgery repeat CT scan was done today which did not show any change from CT scan last night.   His bilirubin is 34 AST ALT is elevated

## 2020-04-20 NOTE — PROGRESS NOTES
exposure. We will continue to follow along with you. Herbert Linton MD  [facility]     This note is created with the assistance of a speech-recognition program. While intending to generate a document that actually reflects the content of the visit, no guarantees can be provided that every mistake has been identified and corrected by editing.

## 2020-04-20 NOTE — PROGRESS NOTES
Neurosurgery [facility]/Resident    Daily Progress Note   Chief Complaint   Patient presents with    Altered Mental Status     4/20/2020  6:52 AM    Chart reviewed. Interval increase SDH, persisting 4mm rightward shirt on head CT 4/20. Intubated on propofol. Vitals:    04/20/20 0331 04/20/20 0400 04/20/20 0500 04/20/20 0614   BP:  121/63 136/64    Pulse: 75 72 74 70   Resp: 17 16 17 18   Temp:  99.9 °F (37.7 °C)     TempSrc:  Oral     SpO2: 100% 100% 100% 100%   Weight:       Height:         PE: Intubated, propofol held for exam    GCS E2, V1T, M5 = 8T    Motor: moves all extremies        Lab Results   Component Value Date    WBC 20.5 (H) 04/19/2020    HGB 7.6 (L) 04/20/2020    HCT 23.3 (L) 04/20/2020     04/19/2020    CHOL 161 12/11/2012    TRIG 73 12/11/2012    HDL 55 12/11/2012    ALT 90 (H) 04/19/2020     (H) 04/19/2020     04/19/2020    K 3.0 (L) 04/19/2020    CL 94 (L) 04/19/2020    CREATININE 4.87 (H) 04/19/2020    BUN 55 (H) 04/19/2020    CO2 15 (L) 04/19/2020    TSH 1.54 12/11/2012    INR 1.5 04/19/2020    LABA1C 5.7 04/16/2020       Radiology   Ct Head Wo Contrast    Result Date: 4/20/2020  EXAMINATION: CT OF THE HEAD WITHOUT CONTRAST  4/20/2020 5:59 am TECHNIQUE: CT of the head was performed without the administration of intravenous contrast. Dose modulation, iterative reconstruction, and/or weight based adjustment of the mA/kV was utilized to reduce the radiation dose to as low as reasonably achievable. COMPARISON: None.  HISTORY: ORDERING SYSTEM PROVIDED HISTORY: f/u SDH TECHNOLOGIST PROVIDED HISTORY: f/u SDH Reason for Exam: f/u SDH Acuity: Unknown Type of Exam: Unknown FINDINGS: BRAIN/VENTRICLES: Left frontotemporal and parietal acute on chronic subdural plus or minus epidural hemorrhage is noted, with largest collection seen at the inferior left frontal lobe measuring up to 24 mm in greatest thickness, increased in the region of the inferior left frontal lobe in comparison with the prior study. Persisting rightward midline shift is seen at the level of the septum pellucidum measuring 4 mm. ORBITS: The visualized portion of the orbits demonstrate no acute abnormality. SINUSES: The visualized paranasal sinuses and mastoid air cells demonstrate no acute abnormality. SOFT TISSUES/SKULL:  No acute abnormality of the visualized skull or soft tissues. Interval increased volume of acute extra-axial hemorrhage at the inferior left frontal lobe associated with left cerebral acute on chronic subdural plus or minus epidural hemorrhage. Otherwise, unchanged extent of left cerebral acute on chronic extra-axial hemorrhage. Unchanged rightward midline shift at the level of the septum pellucidum measuring 4 mm. A/P  Left frontal SDH     - No neurosurgical interventions planned for now   - Persisting 4mm rightward shirt at septum pellucidum on CT head 4/20, stable neuro exam  - Continue Vit K, and tranexamin acid   - Discussed d/c pentoxifyline risk/benefit with Gastroenterology, GI will d/c to potentially decrease worsening SDH  - Continue Keppra  - Neuro checks per floor protocol      Please contact neurosurgery with any changes in patients neurologic status. KUN Waite   6:52 AM EDT       Patient seen and examined with PA at 1200. GCS 3 with sedation. Repeat exam 2 hours off sedation still GCS 3, pupil 3 mm minimally reactive, positive gag/cough/corneal reflex. STAT CT was done and showed progressive intracerebral hemorrhage from the left frontal lobe in with intraventricular extension with hydrocephalus. I spoke with Nephrologist and GI then they are uncertain if live and renal failure is reversible and prognosis is uncertain but possibly poor given his systemic issues. I called his wife outlining his current status and needs for immediate CSF diversion via external ventricular drain, which may be futile.  There is also high risk of brain bleeding due liver and renal

## 2020-04-20 NOTE — PROGRESS NOTES
NOTIFICATION RETURN TO WORK / SCHOOL 
 
1/20/2019 9:41 AM 
 
Mr. Dave Partida [address] To Whom It May Concern: Mr. Dave Partida is currently caring for: 
 
Aundrea Carter, who is currently under the care of [facility]. He will return to work on: 1/21/19 If there are questions or concerns please have the patient contact our office.  
 
 
 
Sincerely, 
 
 
 
 
 
Valorie Harmon, DO 
 
                                
 
 [facility] Progress Note    Eduar Menjivar is a 60 y.o. male patient. Hospitalization Day:4      Chief consult reason: Cirrhosis    Subjective: Patient seen and examined. Intubated and on levophed. Weaning off levophed as per the nurse with preserved MAP. Rectal tube in place with dark brownish liquid stools. Low UO with dark brown sedimented urine. On PPI, octreotide drip. Hb stable at 7.4 since yesterday after 1 U PRBC. VITALS:  /64   Pulse 72   Temp 99.9 °F (37.7 °C) (Oral)   Resp 17   Ht 5' 9\" (1.753 m)   Wt 169 lb 5 oz (76.8 kg)   SpO2 (!) 88%   BMI 25.00 kg/m²   TEMPERATURE:  Current - Temp: 99.9 °F (37.7 °C); Max - Temp  Av.3 °F (37.4 °C)  Min: 99 °F (37.2 °C)  Max: 99.9 °F (37.7 °C)    Physical Assessment:  General appearance: Sclera icteric,Intubated, sedated on Propofol. Mental Status: Intubated and sedated. Lungs:  Dimished to auscultation bilaterally, on vent. Heart:  regular rate and rhythm, no murmur  Abdomen:  soft, but distended, hypoactive bowel sounds. Extremities:  no edema, no redness  Skin:  warm, dry, no gross lesions or rashes    Data Review:  LABS and IMAGING:     CBC  Recent Labs     20  0435  20  0517 20  1418 20  1745 20  0032 20  0649   WBC 17.9*  --  20.5*  --   --   --  15.3*   HGB 8.1*   < > 7.2* 7.7* 7.8* 7.6* 7.4*   MCV 96.6  --  100.4  --   --   --  98.3   RDW 23.5*  --  24.8*  --   --   --  25.6*     --  209  --   --   --  180    < > = values in this interval not displayed. Immature PLTs   No results found for: PLTFLUORE    ANEMIA STUDIES  No results for input(s): LABIRON, TIBC, IRON, FERRITIN, [id number], FOLATE, OCCULTBLD in the last 72 hours.     BMP  Recent Labs     20  0435 20  0517 20  1215 20  2154 20  0649    136 135 139 143   K 3.3* 3.6* 3.0*  --  2.3*   CL 95* 96* 94*  --  98   CO2 21 15* 15*  --  15*   BUN 93* 55*  --   --  70* point  Electronically signed by Burke Souza MD

## 2020-04-20 NOTE — FLOWSHEET NOTE
1345:  Arrived to pt's bedside to setup dialysis machines while pt being taken per ICU bed on portable vent for stat CT head since pt unresponsive despite Diprivan gtt being turned off for past 2.5-3 hours per Clementine CAMPBELL RN.    1410:  Pt returns from stat CT scan head. 1415:  Pt assessed, pre wt and VS obtained. 1425:  Dr. Maggie Francis here updated on stat CT head shows SDH bleed worse and now into the mid brain per Dr. Maggy Zacarias here now and trying to contact the pt's wife to discuss possible interventions to control the bleed. 1435:  Dr. Maggie Francis updated on delay in start of HD tx and Dr. Maggie Francis told Elder Bakari to wait until after any  intervention is done to start HD tx.  1448:  Pt's wife gives consent for Dr. Marley Mott to place EVD drain and Neurosurgery team orders x1 unit of FFP and 1 pack of platelets to be transfused prior to EVD placement. [id number]:  Pt given x1 unit of FFP and x1 pack of platelets per Clementine CAMPBELL RN while Dr. Marietta Ann preps the pt for EVD placement. 1622:  EVD placed but Dr. Marietta Ann wants stat CT repeated to check EVD position. 1650:  Pt taken per ICU bed on portable vent for repeat stat CT head. 1700: Writer updated Dr. Maggie Francis on further delay in start of HD tx. Verbal orders received to change tx time to 3.5 hours since no fluid removal planned. 1725:  Pt returns from CT. 1730:  HD tx initiated as per Dr. Nerissa Wesley orders.

## 2020-04-20 NOTE — PROGRESS NOTES
Occupational Therapy Not Seen Note    DATE: 2020  Name: Peter Siddiqi  : 1955  MRN: [id number]    Patient not available for Occupational Therapy due to:    Sedation: pt intubated and sedated at this time     Next Scheduled Treatment: check back 2020    Electronically signed by MARICRUZ Reed on 2020 at 10:40 AM

## 2020-04-20 NOTE — PROGRESS NOTES
Room Air    VENT SETTINGS (Comprehensive) (if applicable): PRVC mode, FiO2 65%, PEEP 12, Respiratory Rate 14, Tidal Volume 490  Vent Information  $Ventilation: $Subsequent Day  Skin Assessment: Clean, dry, & intact  Vent Type: Servo i  Vent Mode: PRVC  Vt Ordered: 490 mL  Rate Set: 14 bmp  FiO2 : 65 %  SpO2: (!) 88 %  SpO2/FiO2 ratio: (P) 135.38  Sensitivity: 3  PEEP/CPAP: 12  I Time/ I Time %: 0.95 s  Humidification Source: HME  Additional Respiratory  Assessments  Pulse: 72  Resp: 17  SpO2: (!) 89 %  End Tidal CO2: 25 (%)  pCO2 (TCOM, mmHg): 31 mmHg  Position: Semi-Prater's  Humidification Source: HME  Oral Care Completed?: Yes  Oral Care: Teeth brushed, Mouthwash, Mouth swabbed, Mouth suctioned, Suction toothette  Subglottic Suction Done?: Yes  Cuff Pressure (cm H2O): (MOV)    Lab Results   Component Value Date    RKU1IUS 21 04/19/2020    FIO2 50.0 04/19/2020     Lactic Acid:   Lab Results   Component Value Date    LACTA NOT REPORTED 04/18/2020    LACTA NOT REPORTED 04/18/2020    LACTA NOT REPORTED 04/17/2020         DATA:  Complete Blood Count:   Recent Labs     04/18/20  0435  04/19/20  0517  04/19/20  1745 04/20/20  0032 04/20/20  0649   WBC 17.9*  --  20.5*  --   --   --  15.3*   HGB 8.1*   < > 7.2*   < > 7.8* 7.6* 7.4*   MCV 96.6  --  100.4  --   --   --  98.3     --  209  --   --   --  180   RBC 2.62*  --  2.27*  --   --   --  2.29*   HCT 25.3*   < > 22.8*   < > 23.7* 23.3* 22.5*   MCH 30.9  --  31.7  --   --   --  32.3   MCHC 32.0  --  31.6  --   --   --  32.9   RDW 23.5*  --  24.8*  --   --   --  25.6*   MPV 12.2  --  12.0  --   --   --  12.2    < > = values in this interval not displayed.         PT/INR:    Lab Results   Component Value Date    PROTIME 14.8 04/20/2020    INR 1.4 04/20/2020     PTT:    Lab Results   Component Value Date    APTT 51.2 04/16/2020       Basal Metabolic Profile:   Recent Labs     04/18/20  0435 04/19/20  0517 04/19/20  1215 04/19/20  2154 04/20/20  0649    136 135 FERRITIN, [id number], FOLATE, OCCULTBLD in the last 72 hours. Cultures during this admission:     Blood cultures:                 [] None drawn      [] Negative             []  Positive (Details:  )  Urine Culture:                   [] None drawn      [] Negative             []  Positive (Details:  )  Sputum Culture:               [] None drawn       [] Negative             []  Positive (Details:  )   Endotracheal aspirate:     [] None drawn       [] Negative             []  Positive (Details:  )     ASSESSMENT:     Principal Problem:    Acute metabolic encephalopathy  Active Problems:    Essential hypertension    Hyperlipidemia    GERD (gastroesophageal reflux disease)    Jaundice    Liver failure (HCC)    Acute on chronic intracranial subdural hematoma (HCC)    Hepatic encephalopathy (HCC)    Alcoholic hepatitis    Alcoholic ketoacidosis    MARYLIN (acute kidney injury) ([facility] [address]  Resolved Problems:    * No resolved hospital problems. *      PLAN:     1. Acute metabolic encephalopathy secondary to hepatic encephalopathy  Intubated PRVC 14/490/10/50%. SBT as per protocol  Sedated with propofol 10  Continue lactulose titrated to 3 bowel movements. Continue rifaximin    2. Decompensated liver cirrhosis secondary to alcohol hepatitis  LFTs trending down. Bilirubin trending down, 34.96 today. Pentoxifylline 400 mg twice a day as per GI  Continue lactulose and albumin    3. Acute kidney injury with high AG metabolic acidosis  Dialysis dependent. Last HD  4/18/2020. Continue albumin infusion  Will need dialysis today. Minimal UO. Monitor strict I/O's  Bicarbonate infusion 75 mEq in half-normal saline at 100 mL/h as per nephro    4. Hypotension. On 4 mics Levophed today. Continue midodrine  5. Right Lower Lobe Pneumonia, possible aspiration. Continue Zosyn. Follow cultures  6. Subdural hematoma with 3 mm midline shift. Stable. Neurosurgery following. On Keppra  7. Hyponatremia. Resolved.   Received half dose DDAVP

## 2020-04-21 NOTE — PROGRESS NOTES
Critical Care Team - Daily Progress Note      Date and time: 4/21/2020 10:59 AM  Patient's name:  [address] Record Number: [id number]  Patient's account/billing number: [de-identified]  Patient's YOB: 1955  Age: 60 y.o. Date of Admission: 4/16/2020  3:10 PM  Length of stay during current admission: 5      Primary Care Physician: Veronique Tolbert MD  ICU Attending Physician: Dr. Diana Paul Status: DNR-CCA    Reason for ICU admission:   Chief Complaint   Patient presents with    Altered Mental Status     SUBJECTIVE:     OVERNIGHT EVENTS:         Patient worsened overnight  Worsened neurological status with increased intracranial hemorrhage and midline shift with possible herniation  EVD drain placed to maintain ICP. ICP high in 80s-100s  Patient remains unresponsive except for few spontaneous breaths in between  Intubated. Sedated with propofol  Continues to be on high FiO2 60% and PEEP 10  Blood pressure in 130s to 140s. Off Levophed since yesterday evening.   Had dialysis yesterday with 1200 mL removed, minimal urine output  Stool 1600 mL last 24 hours    AWAKE & FOLLOWING COMMANDS:  [x] No   [] Yes    CURRENT VENTILATION STATUS:     [x] Ventilator  [] BIPAP  [] Nasal Cannula [] Room Air      IF INTUBATED, ET TUBE MARKING AT LOWER LIP:       cms    SECRETIONS Amount:  [] Small [x] Moderate  [] Large  [] None  Color:     [x] White [] Colored  [] Bloody    SEDATION:  RAAS Score:  [x] Propofol gtt  [] Versed gtt  [] Ativan gtt   [] No Sedation    PARALYZED:  [x] No    [] Yes    DIARRHEA:                [] No                [x] Yes  (C. Difficile status: [] positive                                                                                                                       [] negative                                                                                                                     [] pending)    VASOPRESSORS:  [x] No    [] Yes    If yes -   [] Levophed 04/19/20  0517 04/20/20  0649 04/20/20  2335 04/21/20  0613   GLUCOSE 128* 134* 136* 148*      HgBA1c:    Lab Results   Component Value Date    LABA1C 5.7 04/16/2020         TSH:    Lab Results   Component Value Date    TSH 1.54 12/11/2012     ANEMIA STUDIES  No results for input(s): LABIRON, TIBC, FERRITIN, [id number], FOLATE, OCCULTBLD in the last 72 hours. Cultures during this admission:     Blood cultures:                 [] None drawn      [] Negative             []  Positive (Details:  )  Urine Culture:                   [] None drawn      [] Negative             []  Positive (Details:  )  Sputum Culture:               [] None drawn       [] Negative             []  Positive (Details:  )   Endotracheal aspirate:     [] None drawn       [] Negative             []  Positive (Details:  )     ASSESSMENT:     Principal Problem:    Acute metabolic encephalopathy  Active Problems:    Essential hypertension    Hyperlipidemia    GERD (gastroesophageal reflux disease)    Jaundice    Liver failure (HCC)    Acute on chronic intracranial subdural hematoma (HCC)    Hepatic encephalopathy (HCC)    Alcoholic hepatitis    Alcoholic ketoacidosis    MARYLIN (acute kidney injury) ([facility] [address]    Disorientation  Resolved Problems:    * No resolved hospital problems. *      PLAN:     1. Intraparenchymal hemorrhage extending to subarachnoid and intraventricular space. Worsening midline shift, hydrocephalus and possible herniation. Ischemic stroke  High ICP 80s-100s  Poor prognosis. No acute surgical intervention possible. ICP and sodium as per neurosurgery/neurology    2. Acute metabolic encephalopathy secondary to hepatic encephalopathy  Intubated PRVC 14/490/10/60%. SBT as per protocol  Sedated with propofol 10  Continue lactulose titrated to 3 bowel movements. Continue rifaximin    3. Decompensated liver cirrhosis secondary to alcohol hepatitis  LFTs trending down. Bilirubin trending down, 34.7 today.   Pentoxifylline stopped as it

## 2020-04-21 NOTE — CONSULTS
philosophies  Complex, time-intensive communication and interdisciplinary psychosocial support  Clarification of goals of care and/or assistance with difficult decision-making  Guidance in regards to resources and transition(s)    Members of PC team contributing to this consultation are :  PC attending- Dr. Tomasa Mckinney fellow- Dr. Phillip Reyez, [facility] [place] resident - Dr. Nate Ybarra    History of Present Illness     The patient is a 60 y.o. Non-/non  male who presents with Altered Mental Status  The patient presented with jaundice, abdominal distension. Head CT showed left frontal subdural hematoma. He also has a history of Hepatitis C with liver failure, renal failure requiring dialysis and GI bleed. His condition has been progressively worsening neurologically, when repeat Head CT showed progressive intracerebral hemorrhage from left frontal lobe with intraventricular extension and hydrocephalus. EVD was placed, and replaced overnight as it had been off. Patient's wife Renaldo Rosario was called around 6:45 am on 4/21 to update status, when his code status was changed to [facility]. Palliative care team was then consulted for code status discussion, goals of care and family support. Referred to Palliative Care by   [x] Physician   [] Nursing  [] Family Request   [] Other:  NP Adal Verma     He was admitted to the critical care service for Hepatic encephalopathy ([facility] [K72.90]  Hepatic encephalopathy ([facility] [K72.90]. His hospital course has been associated with Acute metabolic encephalopathy.  The patient has a complicated medical history and has been hospitalized since 4/16/2020  3:10 PM.    Active Hospital Problems    Diagnosis Date Noted    Disorientation [R41.0]     MARYLIN (acute kidney injury) ([facility] [E26.5]     Alcoholic hepatitis [U89.24] 20/17/0454    Alcoholic ketoacidosis [L52.9] 04/17/2020    Jaundice [R17] 04/16/2020    Liver failure ([facility] [K72.90] 04/16/2020    Acute metabolic encephalopathy [X66.01] 04/16/2020  Acute on chronic intracranial subdural hematoma (HCC) [I62.01, I62.03] 2020    Hepatic encephalopathy (HCC) [K72.90] 2020    Essential hypertension [I10] 2012    Hyperlipidemia [E78.5] 2012    GERD (gastroesophageal reflux disease) [K21.9] 2012       PAST MEDICAL HISTORY      Diagnosis Date    MARYLIN (acute kidney injury) ([facility]     Atrial premature beats     Chronic back pain     Depression     GERD (gastroesophageal reflux disease)     GERD (gastroesophageal reflux disease)     Hyperlipidemia     Hypertension     Liver failure ([facility] 2020    Osteoarthritis     Substance abuse ([facility]     past       PAST SURGICAL HISTORY  Past Surgical History:   Procedure Laterality Date    FOREARM SURGERY      HERNIA REPAIR      KNEE SURGERY      LIPOMA RESECTION      [place] [facility] N/A 10/20/2018    TRACHEOTOMY FROM EMERGENT [address] IN ED performed by Diandra Barnes MD at [address] [place] [address] History     Tobacco Use    Smoking status: Former Smoker     Last attempt to quit: 1999     Years since quittin.4    Smokeless tobacco: Current User   Substance Use Topics    Alcohol use: No    Drug use: No       ALLERGIES  Allergies   Allergen Reactions    Ace Inhibitors Swelling     Severe angioedema required emergent tracheostomy    Ibuprofen          MEDICATIONS  Current Medications    sodium chloride flush  10 mL Intravenous 2 times per day    IVPB builder   Intravenous Q8H    thiamine and folic acid IVPB   Intravenous Q24H    potassium chloride  60 mEq Intravenous Once    phytonadione (ADULT)  10 mg Subcutaneous Daily    lactulose  20 g Oral TID    tranexamic acid (CYCLOKAPRON) IVPB  5 mg/kg Intravenous Q24H    midodrine  5 mg Oral TID    rifaximin  550 mg Oral BID    insulin lispro  0-6 Units Subcutaneous TID WC    insulin lispro  0-3 Units Subcutaneous Nightly    sodium chloride flush  10 mL Intravenous 2 times per day    levetiracetam  500 mg Intravenous Daily     sodium chloride flush, dextrose, fentanNYL, glucose, dextrose, glucagon (rDNA), dextrose, sodium chloride flush, sodium chloride, sodium chloride, heparin (porcine), heparin (porcine), sodium chloride flush, polyethylene glycol, promethazine **OR** ondansetron  IV Drips/Infusions   norepinephrine (LEVOPHED) infusion Stopped (04/21/20 0758)    IV infusion builder 100 mL/hr at 04/21/20 0732    sodium chloride 10 mL/hr at 04/19/20 2316    pantoprozole (PROTONIX) infusion 8 mg/hr (04/21/20 0126)    octreotide (SANDOSTATIN) infusion 50 mcg/hr (04/21/20 0126)    propofol 30 mcg/kg/min (04/20/20 2053)    dextrose       Home Medications  No current facility-administered medications on file prior to encounter. Current Outpatient Medications on File Prior to Encounter   Medication Sig Dispense Refill    EPINEPHrine (EPIPEN) 0.3 MG/0.3ML SOAJ injection INJECT INTRAMUSCULARLY AS DIRECTED  1    metoprolol tartrate (LOPRESSOR) 25 MG tablet Take 1 tablet by mouth 2 times daily 60 tablet 3    amLODIPine (NORVASC) 10 MG tablet Take 1 tablet by mouth daily 30 tablet 3    hydrochlorothiazide (HYDRODIURIL) 25 MG tablet Take 1 tablet by mouth daily. 30 tablet 11    simvastatin (ZOCOR) 20 MG tablet Take 1 tablet by mouth nightly. 30 tablet 11    omeprazole (PRILOSEC) 20 MG capsule Take 1 capsule by mouth Daily. 30 capsule 3    QUEtiapine (SEROQUEL) 100 MG tablet Take 100 mg by mouth 2 times daily.  citalopram (CELEXA) 20 MG tablet Take 20 mg by mouth daily.  sildenafil (VIAGRA) 50 MG tablet Take 1 tablet by mouth as needed for Erectile Dysfunction.  30 tablet 3       Data         BP (!) 173/86   Pulse 55   Temp 97.7 °F (36.5 °C) (Oral)   Resp 15   Ht 1.753 m   Wt 80 kg   SpO2 97%   BMI 26.05 kg/m²     Wt Readings from Last 3 Encounters:   04/20/20 80 kg   04/16/20 84.4 kg   11/06/18 92.5 kg        Code Status: DNR-CCA     ADVANCED CARE PLANNING:  Patient has capacity for medical decisions: no  Health Care Power of : yes  Living Will: no     Personal, Social, and Family History  Marital Status:   Living situation:with family:  spouse  Does patient understand diagnosis/treatment? no  Does caregiver understand diagnosis/treatment? yes    Assessment        REVIEW OF SYSTEMS  Patient is intubated- unable to perform. PHYSICAL ASSESSMENT:  Constitutional: patient is intubated, sedated. Head: Normocephalic and atraumatic. Eyes: EOM are normal. Pupils are equal, round   Neck: Normal range of motion. Neck supple. No tracheal deviation present. Cardiovascular: Normal rate and regular rhythm, S1, S2, no murmur   Pulmonary/Chest: On mechanical ventilator  Abdomen: Soft. No tenderness, not distended, no ascites, no organomegaly   Musculoskeletal: Normal range of motion, mild edema lower ext. Neurological: Intubated, sedated, not following commands  Skin: Normal turgor, no bleeding, no bruising     Palliative Performance Scale:  ___60%  Ambulation reduced; Significant disease; Can't do hobbies/housework; intake normal or reduced; occasional assist; LOC full/confusion  ___50%  Mainly sit/lie; Extensive disease; Can't do any work; Considerable assist; intake normal or reduced; LOC full/confusion  ___40%  Mainly in bed; Extensive disease; Mainly assist; intake normal or reduced; LOC full/confusion   ___30%  Bed Bound; Extensive disease; Total care; intake reduced; LOCfull/confusion  __x_20%  Bed Bound; Extensive disease; Total care; intake minimal; Drowsy/coma  ___10%  Bed Bound; Extensive disease; Total care; Mouth care only; Drowsy/coma  ___0       Death      Plan      Palliative Interaction:  PC team met with the neuro-critical care team and the primary critical care team, who explained that Rojas Pan has worsening neurological status with herniation. Palliative care team met with the wife- Claudia Montoya at the bedside.    Claudiaroseanna Montoya states that she understands the

## 2020-04-21 NOTE — PROGRESS NOTES
heterogeneous hematoma in the left frontal region is again noted with a fluid fluid level. This extends down to the cribriform plate, greatest left of midline. Heterogeneous extra-axial collection containing hemorrhage in fluid is noted over the left hemisphere. This is very similar compared to the prior exam.  A small amount of blood density is suggested along the interhemispheric fissure and falx. Multifocal subarachnoid hemorrhage on the right is again noted. Heterogeneous extra-axial collection in the right inferior frontal parasagittal region is again noted containing fluid and blood. Air density in this area is also noted. Developing left frontal parietal parasagittal cortical low-density is noted. There is also suggested new low-density just above the lateral ventricles in the right posterior frontal parietal parasagittal region. A large amount of left frontal lobe parenchymal low density is noted. Similar findings were noted on the prior. This appears slightly increased however. Corpus callosum body low-density is suggested. Multifocal white matter low density is noted bilaterally. ORBITS: The visualized portion of the orbits demonstrate no acute abnormality. SINUSES: The visualized paranasal sinuses and mastoid air cells demonstrate no acute abnormality. SOFT TISSUES/SKULL:  No acute abnormality of the visualized skull or soft tissues. Shunt repositioning as described with slight decrease in the ventricle size. Multi compartmental intracranial hemorrhage again noted. Increasing edema in the left frontal lobe. There is also developing infarction involving the left frontal parietal parasagittal cortex. Increasing left-to-right midline shift at the level of the frontal horns of the lateral ventricles measuring between 8 9 mm. Critical results were called by Dr. Za Mario to Thu Love RN on 4/20/2020 at 22:20.      Ct Head Wo Contrast    Result Date: 4/20/2020  EXAMINATION: CT OF THE HEAD WITHOUT CONTRAST  4/20/2020 4:29 pm TECHNIQUE: CT of the head was performed without the administration of intravenous contrast. Dose modulation, iterative reconstruction, and/or weight based adjustment of the mA/kV was utilized to reduce the radiation dose to as low as reasonably achievable. COMPARISON: 4 hours prior HISTORY: ORDERING SYSTEM PROVIDED HISTORY: f/u EVD placement TECHNOLOGIST PROVIDED HISTORY: f/u EVD placement Reason for Exam: f/u EVD placement Acuity: Unknown Type of Exam: Unknown FINDINGS: BRAIN/VENTRICLES: Right frontal shunt catheter is noted crossing midline to the inferior portion of the frontal horn of the left lateral ventricle. Ventricles are similar in size compared to the prior. Multifocal intraventricular hemorrhage is increased. Slight left right midline shift at the level of the lateral ventricular bodies is again noted. This is stable to minimally improved. There is also left-to-right shift in the inferior frontal region at the level of the anterior falx. This is similar compared to the prior. A large amount of extra-axial hemorrhage in the left hemisphere is noted, greatest in the left anterior frontal region. There is also corresponding multifocal extra-axial fluid density on the left. Large left inferior frontal hematoma is noted, unchanged. Surrounding parenchymal edema is again noted. A small amount of right frontal parasagittal hemorrhage is again noted along with a small amount of right frontal extra-axial fluid density. Multifocal right-sided subarachnoid hemorrhage is increased. Extra-axial fluid space anterior to the right temporal lobe is stable. Extra-axial fluid density along the interhemispheric fissure is decreased. Increasing 4th ventricular blood is noted. A component of adjacent parenchymal blood would be difficult to exclude at this level. ORBITS: The visualized portion of the orbits demonstrate no acute abnormality.  SINUSES: A small amount of right

## 2020-04-21 NOTE — PROGRESS NOTES
The transport originated from [facility]. Pt. was transported to CT and back. Assisting with the transport was RT and RN x2. Appropriate devices were applied to monitor the patient's condition during transport. Patient transported  via 60% O2 via ventilator. Patient tolerated well. Placed back on charted settings.       Lauren Campbell  2:47 AM

## 2020-04-21 NOTE — PROGRESS NOTES
The transport originated from [facility]. Pt. was transported to CT and k. Assisting with the transport was RN, RT and aide. Appropriate devices were applied to monitor the patient's condition during transport. Patient transported  via 60% O2 via ventilator. Patient tolerated well. Placed pt back on charted settings.         Barnet Reining  10:21 PM What Is The Reason For Today's Visit?: Upper Body Skin Exam How Severe Are Your Spot(S)?: mild

## 2020-04-21 NOTE — PLAN OF CARE
Plan of care ongoing
Problem: Falls - Risk of:  Goal: Will remain free from falls  Description: Will remain free from falls  Outcome: Ongoing  Goal: Absence of physical injury  Description: Absence of physical injury  Outcome: Ongoing     Problem: Restraint Use - Nonviolent/Non-Self-Destructive Behavior:  Goal: Absence of restraint indications  Description: Absence of restraint indications  4/20/2020 0740 by Jhon Montoya RN  Outcome: Ongoing  4/19/2020 2014 by Bryant Hunt RN  Outcome: Ongoing  Goal: Absence of restraint-related injury  Description: Absence of restraint-related injury  4/20/2020 0740 by Jhon Montoya RN  Outcome: Ongoing  4/19/2020 2014 by Bryant Hunt RN  Outcome: Ongoing     Problem: MECHANICAL VENTILATION  Goal: Patient will maintain patent airway  4/20/2020 0740 by Jhon Montoya RN  Outcome: Ongoing  4/19/2020 1954 by Huey Hopson RCP  Outcome: Ongoing  Goal: Oral health is maintained or improved  4/20/2020 0740 by Jhon Montoya RN  Outcome: Ongoing  4/19/2020 1954 by Huey Hopson RCP  Outcome: Ongoing  Goal: ET tube will be managed safely  4/20/2020 0740 by Jhon Montoya RN  Outcome: Ongoing  4/19/2020 1954 by Huey Hopson RCP  Outcome: Ongoing  Goal: Ability to express needs and understand communication  Outcome: Ongoing  Goal: Mobility/activity is maintained at optimum level for patient  Outcome: Ongoing     Problem: OXYGENATION/RESPIRATORY FUNCTION  Goal: Patient will maintain patent airway  4/20/2020 0740 by Jhon Montoya RN  Outcome: Ongoing  4/19/2020 1954 by Huey Hopson RCP  Outcome: Ongoing  Goal: Patient will achieve/maintain normal respiratory rate/effort  Description: Respiratory rate and effort will be within normal limits for the patient  4/20/2020 0740 by Jhon Montoya RN  Outcome: Ongoing  4/19/2020 1954 by Huey Hopson RCP  Outcome: Ongoing
Problem: MECHANICAL VENTILATION  Goal: Patient will maintain patent airway  4/21/2020 1452 by Shari Whiteside RCP  Outcome: Ongoing     Problem: MECHANICAL VENTILATION  Goal: Oral health is maintained or improved  4/21/2020 1452 by Shari Whiteside RCP  Outcome: Ongoing     Problem: MECHANICAL VENTILATION  Goal: ET tube will be managed safely  4/21/2020 1452 by Shari Whiteside RCP  Outcome: Ongoing     Problem: MECHANICAL VENTILATION  Goal: ET tube will be managed safely  4/21/2020 1452 by Shari Whiteside RCP  Outcome: Ongoing     Problem: MECHANICAL VENTILATION  Goal: Ability to express needs and understand communication  4/21/2020 1452 by Shari Whiteside RCP  Outcome: Ongoing     Problem: OXYGENATION/RESPIRATORY FUNCTION  Goal: Patient will maintain patent airway  4/21/2020 1452 by Shari Whiteside RCP  Outcome: Ongoing     Problem: OXYGENATION/RESPIRATORY FUNCTION  Goal: Patient will maintain patent airway  4/21/2020 1452 by Shari Whiteside RCP  Outcome: Ongoing     Problem: OXYGENATION/RESPIRATORY FUNCTION  Goal: Patient will achieve/maintain normal respiratory rate/effort  Description: Respiratory rate and effort will be within normal limits for the patient  4/21/2020 1452 by Shari Whiteside RCP  Outcome: Ongoing       Ventilator Bronchodilator assessment    Breath sounds: clear upper lung fields; clear, diminished middle and lower lung fields. Inspiratory Pressure: 21  Plateau Pressure: 18    Patient assessed at level 1          [x]    Bronchodilator Assessment    BRONCHODILATOR ASSESSMENT SCORE  Score 0 (Home) 1 2 3 4   Breath Sounds   []  Chronic Ventilator: Patient at baseline [x]  Mild Wheezes/ Clear []  Intermittent wheezes with good air entry []  Bilateral/unilateral wheezing with diminished air entry []  Insp/Exp wheeze and/or poor aeration   Ventilator Pressures   []  Chronic Ventilator [x]  Insp. Pressure less than 25 cm H20 []  Insp. Pressure less than 25 cm H20 []  Insp.  Pressure exceeds 25 cm H20
Problem: MECHANICAL VENTILATION  Goal: Patient will maintain patent airway  Outcome: Ongoing  Goal: Oral health is maintained or improved  Outcome: Ongoing  Goal: ET tube will be managed safely  Outcome: Ongoing  Goal: Ability to express needs and understand communication  Outcome: Ongoing  Goal: Mobility/activity is maintained at optimum level for patient  Outcome: Ongoing     Problem: OXYGENATION/RESPIRATORY FUNCTION  Goal: Patient will maintain patent airway  Outcome: Ongoing  Goal: Patient will achieve/maintain normal respiratory rate/effort  Description: Respiratory rate and effort will be within normal limits for the patient  Outcome: Ongoing
RN  Outcome: Ongoing  4/20/2020 0856 by Gui Morales RCP  Outcome: Ongoing  4/20/2020 0740 by Asia Garcia RN  Outcome: Ongoing     Problem: OXYGENATION/RESPIRATORY FUNCTION  Goal: Patient will maintain patent airway  4/20/2020 1645 by Karolina Medrano RN  Outcome: Ongoing  4/20/2020 0856 by Gui Morales RCP  Outcome: Ongoing  4/20/2020 0740 by Asia Garcia RN  Outcome: Ongoing  Goal: Patient will achieve/maintain normal respiratory rate/effort  Description: Respiratory rate and effort will be within normal limits for the patient  4/20/2020 1645 by Karolina Medrano RN  Outcome: Ongoing  4/20/2020 0856 by Gui Morales RCP  Outcome: Ongoing  4/20/2020 0740 by Asia Garcia RN  Outcome: Ongoing

## 2020-04-21 NOTE — PROGRESS NOTES
Physical Therapy  DATE: 2020    NAME: Meño Solis  MRN: [id number]   : 1955    Patient not seen this date for Physical Therapy due to:  [] Blood transfusion in progress  [] Hemodialysis  []  Patient Declined  [] Spine Precautions   [] Strict Bedrest  [] Surgery/ Procedure  [] Testing      [x] Other: potential DRN CC, terminal extubation per chart. Will ck  for any updates. [] PT being discontinued at this time. Patient independent. No further needs. [] PT being discontinued at this time as the patient has been transferred to palliative care. No further needs.     Lauryn Ellis, PT

## 2020-04-21 NOTE — FLOWSHEET NOTE
Patient's condition is declining. He will be terminally extubated later today. His wife, Emiliano Hays, is at bedside and requested a visit from the  for prayer and spiritual support. I was present with her as she had consult with doctor. I provided emotional and spiritual support. I prayed with her. She is the only family member permitted to visit due to covid restrictions in the hospital. She indicates good support from the family and the [employer] and . 04/21/20 1013   Encounter Summary   Services provided to: Family   Support System Family members; Gnosticism/jared community   Place of [address] [place] [address]   Contact Gnosticism No   Continue Visiting   (4/21/20)   Complexity of Encounter Moderate   Length of Encounter 30 minutes   Spiritual Assessment Completed Yes   Routine   Type Follow up   Assessment Calm; Approachable;Coping   Intervention Active listening;Explored feelings, thoughts, concerns;Nurtured hope;Prayer;Sustaining presence/ Ministry of presence; Discussed belief system/Scientology practices/jared   Outcome Acceptance;Comfort;Expressed gratitude        04/21/20 1013   Encounter Summary   Services provided to: [facility] Family members; Gnosticism/jared community   Place of [address] [place] [address]   Contact Gnosticism No   Continue Visiting   (4/21/20)   Complexity of Encounter Moderate   Length of Encounter 30 minutes   Spiritual Assessment Completed Yes   Routine   Type Follow up   Assessment Calm; Approachable;Coping   Intervention Active listening;Explored feelings, thoughts, concerns;Nurtured hope;Prayer;Sustaining presence/ Ministry of presence; Discussed belief system/Scientology practices/jared   Outcome Acceptance;Comfort;Expressed gratitude

## 2020-04-21 NOTE — PROGRESS NOTES
IVPB  5 mg/kg Intravenous Q24H    midodrine  5 mg Oral TID    rifaximin  550 mg Oral BID    insulin lispro  0-6 Units Subcutaneous TID WC    insulin lispro  0-3 Units Subcutaneous Nightly    sodium chloride flush  10 mL Intravenous 2 times per day    levetiracetam  500 mg Intravenous Daily     Continuous Infusions:    norepinephrine (LEVOPHED) infusion Stopped (20 0758)    IV infusion builder 100 mL/hr at 20 0732    sodium chloride 10 mL/hr at 20 2316    pantoprozole (PROTONIX) infusion 8 mg/hr (20)    octreotide (SANDOSTATIN) infusion 50 mcg/hr (20)    propofol 30 mcg/kg/min (20)    dextrose       PRN Meds:  sodium chloride flush, dextrose, fentanNYL, glucose, dextrose, glucagon (rDNA), dextrose, sodium chloride flush, sodium chloride, sodium chloride, heparin (porcine), heparin (porcine), sodium chloride flush, polyethylene glycol, promethazine **OR** ondansetron    Input/Output:       I/O last 3 completed shifts: In: 2870.7 [I.V.:1009.4; Blood:921.3; NG/GT:80]  Out: 4584 [Urine:60; Drains:71; VYFVL:3928]. Patient Vitals for the past 96 hrs (Last 3 readings):   Weight   20 2115 176 lb 5.9 oz (80 kg)   20 1415 175 lb 11.3 oz (79.7 kg)   20 0600 169 lb 5 oz (76.8 kg)       Vital Signs:   Temperature:  Temp: 97.7 °F (36.5 °C)  TMax:   Temp (24hrs), Av.8 °F (36.6 °C), Min:96.3 °F (35.7 °C), Max:98.4 °F (36.9 °C)    Respirations:  Resp: 15  Pulse:   Pulse: 55  BP:    BP: (!) 173/86  BP Range: Systolic (97LOX), YVB:528 , Min:86 , GDZ:728       Diastolic (32TPU), QVJ:59, Min:48, Max:89      Physical Examination:     General:  On mechanical ventilation, sedated   HEENT: ET tube positive   Eyes:   Eyes closed yes then eventually  Neck:   Supple  Chest:   Bilateral vesicular breath sounds, no rales or wheezes. Cardiac:  S1 S2 RR, no murmurs, gallops or rubs. Abdomen: Soft, no masses or organomegaly, BS audible.   :   No suprapubic WBCUA 5 TO 10 04/17/2020    RBCUA 5 TO 10 04/17/2020    MUCUS NOT REPORTED 04/17/2020    TRICHOMONAS NOT REPORTED 04/17/2020    YEAST NOT REPORTED 04/17/2020    BACTERIA MANY 04/17/2020    CLARITYU clear 07/13/2014    SPECGRAV 1.017 04/17/2020    LEUKOCYTESUR SMALL 04/17/2020    UROBILINOGEN Normal 04/17/2020    BILIRUBINUR LARGE 04/17/2020    BILIRUBINUR negative 07/13/2014    BLOODU negative 07/13/2014    GLUCOSEU NEGATIVE 04/17/2020    KETUA NEGATIVE 04/17/2020    AMORPHOUS NOT REPORTED 04/17/2020     Urine Sodium:     Lab Results   Component Value Date    ISAAC 38 04/17/2020     Urine Potassium:    Lab Results   Component Value Date    KUR 45.5 04/17/2020     Urine Chloride:    Lab Results   Component Value Date    CLUR <20 04/17/2020      Urine Creatinine:     Lab Results   Component Value Date    LABCREA 166.5 04/17/2020     Radiology:     Reviewed. Assessment:     1. Acute kidney injury secondary to ischemic ATN (Acute hepatic injury/HRS 1) complicated by urine retention with 200 cc of urine drained after Barlow insertion -hemodialysis dependent since 17th April. Creatinine peaked to 6.8. Baseline creatinine 1.2 till 2018.   2.  Acute hepatitis/fulminant hepatic failure suspected alcoholic  3. Subdural hematoma status post fall with midline shift-interval increase. 4.  Encephalopathy -multifactorial -hepatic/renal injury/subdural hematoma  5. Ventilatory dependent respiratory failure  6. Anemia  7. Hypotension on pressors  8. Hypokalemia    Plan:   1. No acute need for dialysis today. Continue to monitor labs. 2.  Agree with potassium replacements. 3.  Left frontal subdural hemorrhage treatment as per neuro critical care. 4.  Agree with getting palliative care involved. 5.  Follow-up neuro critical care plan as well as wife decision regarding code status. 6.  Prognosis guarded. 7.  Will follow. Patient's nurse was updated. Nutrition   Please ensure that patient is on a renal diet/TF.

## 2020-04-21 NOTE — SIGNIFICANT EVENT
I had a discussion with the patient's wife in person, in presence of the RN taking care of the patient. Patient's current clinical condition, laboratory and radiographic findings as well as recommendations of physicians consulted on the case were discussed with the patient's wife in detail in simple [place]. All questions and concerns of the wife were addressed, and appropriate emotional support was provided by the . After understanding patient's current medical condition, the wife decided that they did not want to continue the ongoing treatment and decided to terminally extubate. She asked that the patient's code status should be changed to [facility] with terminal extubation, witnessed by RNCarmen.         Yuli Kilgore MD  PGY-2, Internal medicine resident  [place], [place]  4/21/2020 4:49 PM

## 2020-04-21 NOTE — PROGRESS NOTES
Dialysis Post Treatment Note  Vitals:    04/20/20 2115   BP: 113/67   Pulse: 68   Resp: 14   Temp: 98 °F (36.7 °C)   SpO2: 100%     Pre-Weight = 79.7kg  Post-weight = Weight: 176 lb 5.9 oz (80 kg)  Total Liters Processed = Total Liters Processed (l/min): 41.4 l/min  Rinseback Volume (mL) = Rinseback Volume (ml): 350 ml  Net Removal (mL) =0  Type of access used=RIJ QM temp cath  Length of treatment=210 minutes    Pt tolerated full 3.5hrs HD tx as ordered. BP dropped 2-3 times momentarily while EVD placement per Dr. Cy Dickson recovered easily with fluid boluses.

## 2020-04-21 NOTE — OP NOTE
Operative Note      Patient: Saulo Sanderson  YOB: 1955  MRN: [id number]    Date of Procedure: 4/21/2020    Pre-Op Diagnosis: hydrocephalus, right EVD thrombosis     Post-Op Diagnosis: Same    Emergent Revision of right external ventricular drain  Emergent Insertion of left external ventricular drain         Assistant:   * Surgery not found *    Anesthesia: local   Estimated Blood Loss (mL): less than 50     Complications: None    Specimens:   * Cannot find log *    Implants:  * No surgery found *      Drains:   ICP monitor (Active)   Level Other (Comment) 4/20/2020  9:00 PM   Status Open to Drainage 4/20/2020  9:00 PM   Dressing Status Other (Comment) 4/21/2020  3:21 AM   Site Assessment Bleeding 4/21/2020  3:21 AM   Drainage Purulent;Yellow 4/21/2020  3:21 AM   Output Amt 12 ml 4/21/2020  2:46 AM       NG/OG/NJ/NE Tube Nasogastric 18 fr Left nostril (Active)   Surrounding Skin Intact 4/21/2020  3:21 AM   Securement device Yes 4/21/2020  3:21 AM   Status Clamped 4/20/2020  9:00 PM   Placement Verified by External Catheter Length 4/20/2020  9:00 PM   NG/OG/NJ/NE External Measurement (cm) 59 cm 4/19/2020  8:00 PM   Drainage Appearance None 4/20/2020  4:00 AM   Free Water Flush (mL) 80 mL 4/20/2020  9:00 AM       Urethral Catheter Temperature probe 16 fr (Active)   $ Urethral catheter insertion $ Not inserted for procedure 4/20/2020  9:00 PM   Catheter Indications Need for fluid management in critically ill patients in a critical care setting not able to be managed by other means such as BSC with hat, bedpan, urinal, condom catheter, or short term intermittent urethral catherization 4/21/2020  3:26 AM   Site Assessment No urethral drainage 4/21/2020  3:26 AM   Urine Color Tea;Brown 4/21/2020  3:26 AM   Output (mL) 60 mL 4/20/2020  6:35 PM       Fecal Management System (Active)   Stool Appearance Mucous; Bloody 4/21/2020  3:21 AM   Stool Color Red 4/21/2020  3:21 AM   Stool Amount Small 4/19/2020  4:00 AM the CSF started becoming bloody CSF and becoming clotted blood and drainage stopped. I then remove the catheter flushed out the clot and replaced into the ventricle and CSF was seen around 6-1/2 cm deep. I allowed CSF to flow until I am sure there is no further thrombosis in the catheter. This took some time as the catheter drainage would slow and become blood until perfecty depth was achieved that allowed continuous CSF to drain. Finally the catheter was was tunnel 5cm away and capped. Prior to closure,  I rechecked the catheter by recheck the drain and there is continuous good flow. The skin was closed was staples  on both sides. The left EVD was sutured to the scalp with stay suture and exit was dressed with dermabond. I allowed approximately 25 cc of CSF to be drained from both catheters until the pressure was transduced with good waveform with pressure of 25mmHg and draining  xanthochromic CSF well.      Electronically signed by Shira Hoff DO on 4/21/2020 at 4:03 AM

## 2020-04-21 NOTE — PROGRESS NOTES
[facility] Progress Note    Janessa Lawton is a 60 y.o. male patient. Hospitalization Day:5      Chief consult reason: Alcoholic hepatitis    Subjective: Patient seen and examined. Intubated,sedated and on levophed, bicarb, octreotide and protonix drips. Rectal tube in place with adequate output with lactulose. NG tube with minimal enteral feeds. Significant events:  Underwent dialysis, low K 2.3 replaced  Worsened ICH, external ventricular stent placed with revision x2 due to clotting. Neuro critical care evaluated as well. Code status changed to DNR CCA. VITALS:  BP (!) 154/79   Pulse 52   Temp 97.7 °F (36.5 °C) (Oral)   Resp 21   Ht 5' 9\" (1.753 m)   Wt 176 lb 5.9 oz (80 kg)   SpO2 97%   BMI 26.05 kg/m²   TEMPERATURE:  Current - Temp: 97.7 °F (36.5 °C); Max - Temp  Av.8 °F (36.6 °C)  Min: 96.3 °F (35.7 °C)  Max: 98.4 °F (36.9 °C)    Physical Assessment:  General appearance: Sclera icteric,Intubated, sedated on Propofol. Pupils 3 mm, minimally reactive. Mental Status: Intubated and sedated. Lungs:  Dimished to auscultation bilaterally, on vent. Heart:  regular rate and rhythm, no murmur  Abdomen:  soft, but distended, hypoactive bowel sounds. Rectal tube in place. Extremities:  no edema, no redness  Skin:  warm, dry, no gross lesions or rashes    Data Review:  LABS and IMAGING:     CBC  Recent Labs     20  0517  20  0032 20  0649 20  1338 20  1818 20  0613   WBC 20.5*  --   --  15.3*  --  13.1* 13.7*   HGB 7.2*   < > 7.6* 7.4* 7.6* 7.4* 7.0*   .4  --   --  98.3  --  99.1 98.2   RDW 24.8*  --   --  25.6*  --  26.5* 26.7*     --   --  180  --  229 244    < > = values in this interval not displayed. Immature PLTs   No results found for: PLTFLUORE    ANEMIA STUDIES  No results for input(s): LABIRON, TIBC, IRON, FERRITIN, HGYJWTHL24, FOLATE, OCCULTBLD in the last 72 hours.     BMP  Recent Labs     20  2047 04/20/20  1338 04/20/20  2335 04/21/20  0613    139 136 142   K 2.3* 2.5* 2.4* 2.8*   CL 98  --  94* 99   CO2 15*  --  19* 16*   BUN 70*  --  42* 48*   CREATININE 6.64*  --  4.35* 4.99*   GLUCOSE 134*  --  136* 148*   CALCIUM 7.1*  --  6.9* 7.2*       LFTS  Recent Labs     04/19/20  0517 04/20/20  0649 04/21/20  0613   ALKPHOS 412* 328* 306*   ALT 90* 73* 69*   * 268* 239*   BILITOT 34.01* 34.96* 34.74*   BILIDIR 26.31* >27.00* >27.00*   LABALBU 2.2* 2.6* 2.7*       AMYLASE/LIPASE/AMMONIA  Recent Labs     04/21/20  0613   AMMONIA 66*       Acute Hepatitis Panel   Lab Results   Component Value Date    HEPBSAG NONREACTIVE 04/17/2020    HEPCAB NONREACTIVE 04/17/2020    HEPBIGM NONREACTIVE 04/17/2020    HEPAIGM NONREACTIVE 04/17/2020       HCV Genotype   No results found for: HEPATITISCGENOTYPE    HCV Quantitative   No results found for: HCVQNT    LIVER WORK UP:    AFP  Lab Results   Component Value Date    AFP 2.9 04/17/2020       Alpha 1 antitrypsin   Lab Results   Component Value Date    A1A 167 04/17/2020       Anti - Liver/Kidney Ab  Lab Results   Component Value Date    LIVER-KIDNEYMICROSOMALAB <1:20 04/17/2020       RAQUEL  Lab Results   Component Value Date    RAQUEL NEGATIVE 04/17/2020       AMA  Lab Results   Component Value Date    MITOAB 3.6 04/17/2020       ASMA  Lab Results   Component Value Date    SMOOTHMUSCAB 1:40 04/17/2020       PT/INR  Recent Labs     04/20/20  0649 04/20/20  1818 04/20/20  2335   PROTIME 14.8* 13.9* 14.4*   INR 1.4 1.3 1.4       Cancer Markers:  CEA:  No results for input(s): CEA in the last 72 hours. Ca 125:  No results for input(s):  in the last 72 hours. Ca 19-9:   No results for input(s):  in the last 72 hours. AFP:   No results for input(s): AFP in the last 72 hours.     Lactic acid:  Recent Labs     04/18/20  1249 04/18/20  1906 04/19/20  1215   LACTACIDWB 5.5* 5.5* 3.8*       Radiology Review:    Us Liver    Addendum Date: 4/17/2020    ADDENDUM: The liver measures 23 cm in length. Result Date: 4/17/2020  EXAMINATION: RIGHT UPPER QUADRANT ULTRASOUND 4/17/2020 7:47 am COMPARISON: None. HISTORY: ORDERING SYSTEM PROVIDED HISTORY: cirrhosis evaluation TECHNOLOGIST PROVIDED HISTORY: cirrhosis evaluation FINDINGS: LIVER:  Coarsened echotexture of the liver. No focal mass. Minimal undulating contours of the liver. No biliary ductal dilatation. Appropriate hepatopetal flow in the portal vein. BILIARY SYSTEM:  Biliary sludge versus reverberation artifact in the dependent portion of the gallbladder. Common bile duct is within normal limits measuring 0.4 cm. RIGHT KIDNEY: No gross evidence of hydronephrosis. See separate renal report for further details. PANCREAS:  Visualized portions of the pancreas are unremarkable. OTHER: Trace ascites. Coarsened echotexture of the liver suggesting underlying hepatocellular disease such as cirrhosis. ENDOSCOPY     Principal Problem:    Acute metabolic encephalopathy  Active Problems:    Essential hypertension    Hyperlipidemia    GERD (gastroesophageal reflux disease)    Jaundice    Liver failure (HCC)    Acute on chronic intracranial subdural hematoma (HCC)    Hepatic encephalopathy (HCC)    Alcoholic hepatitis    Alcoholic ketoacidosis    MARYLIN (acute kidney injury) ([facility] [address]    Disorientation  Resolved Problems:    * No resolved hospital problems. *       GI Assessment:    1. Acute liver failure secondary to severe alcoholic hepatitis/cirrhosis - -DF 69 on admission. On Lactulose. Discontinued Pentoxifylline due to worsening intracranial bleeding. 2.  AMS with hepatic encephalopathy : s/p intubation 04/18/2020  3. Acute blood loss anemia: s/p 1 U PRBC. Currently no signs of bleeding, Hb at 7.  4. Coagulopathy with elevated INR: Liver failure Vs uremic. On daily Vit K- IV. 5.  Left frontal subdural hemorrhage complicated with midline shift, intracranial and intraventricular bleeding: Emergent EVD x2. Neurosurgery following. On Tranexamic acid. 6.  MARYLIN secondary to ischemic ATN and urinary retention-on hemodialysis and bicarb drip. 7. Chronic alcohol abuse due to depression. 8. Prognosis guarded with respect to hepato-renal syndrome, worsening renal function requiring dialysis, respiratory status, coagulopathy and worsening ICH with EVD. Plan of care:  1. Monitor hemoglobin and hematocrit. Observe for any signs of GI bleeding. 2. Continue lactulose and rifaximin for HE. 3. Continue Protonix and octreotide drip. 4. Continue enteral tube feeds as tolerated. Further recommendations to follow after discussing with Dr. Kira Macario, [address] [place] [address] Gastroenterology  [facility]  4/20/2020 at 10:14 AM     Attending Physician Statement  I have discussed the care of Lj Orlando and   I have examined the patient myselft independently, and taken ros and hpi , including pertinent history and exam findings,  with the author of this note . I have reviewed the key elements of all parts of the encounter with the nurse practitioner/resident.     I agree with the assessment, plan and orders as documented by the above health care provider         Electronically signed by Arnold Milton MD

## 2020-04-21 NOTE — PROGRESS NOTES
Dr Gisselle Villalobos at bedside with Neurosurgery resident to place EVD drain. Timeout called and verified prior to procedure began. PT vitals stable see Epic flowsheet.

## 2020-04-21 NOTE — PROGRESS NOTES
Patient extubated per physician order. Patient extubated in usual fashion. Patient placed on  2 liters/min via nasal cannula.      Karuna Gunter   5:19 PM

## 2020-04-22 LAB
ALBUMIN (CALCULATED): 2.9 G/DL (ref 3.2–5.2)
ALBUMIN PERCENT: 55 % (ref 45–65)
ALPHA 1 PERCENT: 4 % (ref 3–6)
ALPHA 2 PERCENT: 5 % (ref 6–13)
ALPHA-1-GLOBULIN: 0.2 G/DL (ref 0.1–0.4)
ALPHA-2-GLOBULIN: 0.3 G/DL (ref 0.5–0.9)
BETA GLOBULIN: 1 G/DL (ref 0.5–1.1)
BETA PERCENT: 19 % (ref 11–19)
CULTURE: NORMAL
CULTURE: NORMAL
GAMMA GLOBULIN %: 17 % (ref 9–20)
GAMMA GLOBULIN: 0.9 G/DL (ref 0.5–1.5)
Lab: NORMAL
Lab: NORMAL
P E INTERPRETATION, U: NORMAL
PATHOLOGIST: ABNORMAL
PATHOLOGIST: NORMAL
PATHOLOGIST: NORMAL
PROTEIN ELECTROPHORESIS, SERUM: ABNORMAL
SERUM IFX INTERP: NORMAL
SPECIMEN DESCRIPTION: NORMAL
SPECIMEN DESCRIPTION: NORMAL
SPECIMEN TYPE: NORMAL
TOTAL PROT. SUM,%: 100 % (ref 98–102)
TOTAL PROT. SUM: 5.3 G/DL (ref 6.3–8.2)
TOTAL PROTEIN: 5.3 G/DL (ref 6.4–8.3)
URINE IFX INTERP: NORMAL
URINE IFX SPECIMEN: NORMAL
URINE TOTAL PROTEIN: 146 MG/DL
URINE TOTAL PROTEIN: 146 MG/DL
VOLUME: NORMAL ML

## 2020-04-22 NOTE — SIGNIFICANT EVENT
ICU DEATH NOTE    PATIENT NAME: Mee Hurst  YOB: 1955  MEDICAL RECORD NO. [id number]  DATE: 4/22/2020  PRIMARY CARE PHYSICIAN: Franko Tovar MD    DIAGNOSIS OF DEATH     I have confirmed the death of this patient in accordance with accepted medical standards.   The patient is dead as evidenced by cardiac death or cessation of brain function:    Cardiac Death (check all that apply):     [x]  Absence of respiratory effort by observation      [x]  Absence of pulse by palpation     []  Absence of blood pressure by sphygmomanometry     [x]  Absence of sustainable cardiac rhythm by monitor    Death by Cessation of Brain Function (check all that apply):     [x]  Absence of Cerebral Function with no motor response     [x]  Absence of brain stem function by systemic physical exam     []  Failure to respond with respiratory drive by apnea test     []  Cerebral Electrical silence as interpreted by qualified reader        OR     []  Absence of brain blood flow by radiologic technique      CERTIFICATION OF DEATH     I have pronounced the patient dead on:     Date: 4/21/2020 at 5:30 PM     NOTIFICATIONS     Attending physician that will sign Death Certificate: Dr. Jhonny Curling notified: Name and/or Relationship: Wife                                                          []  Per Nursing     notified (Name):                                                           [x]  Per Chilo Call MD  PGY-2, Internal medicine resident  [address], [place]  4/22/2020 1:47 AM

## 2020-04-27 NOTE — DISCHARGE SUMMARY
[address] [place] [address]     Department of Internal Medicine - Staff Internal Medicine Service    INPATIENT DISCHARGE SUMMARY        Patient Identification:  Lisa Callahan is a 60 y.o. male. :  1955  MRN: [id number]     Acct: [de-identified]   Admit Date:  2020  Discharge date and time: 2020  7:30 PM   Attending Provider: No att. providers found                                     Admission Diagnoses:   Hepatic encephalopathy ([facility] [address] [K72.90]  Hepatic encephalopathy ([facility] [address] [K72.90]    Discharge Diagnoses:   Principal Problem:    Acute metabolic encephalopathy  Active Problems:    Essential hypertension    Hyperlipidemia    GERD (gastroesophageal reflux disease)    Jaundice    Liver failure ([facility] [address]    Acute on chronic intracranial subdural hematoma (HCC)    Hepatic encephalopathy (HCC)    Alcoholic hepatitis    Alcoholic ketoacidosis    MARYLIN (acute kidney injury) ([facility] [address]    Disorientation  Resolved Problems:    * No resolved hospital problems. *       Consults:   neurosurgery, pulmonary/intensive care, GI, nephrology and neurology    Brief Inpatient course:    Patient is a 69-year-old male who presented with increased alcohol consumption for the last 2 to 3 weeks and admitted with altered mental status, fatigue, jaundice initially to [facility] and with history of multiple falls. He was found to have subdural hematoma at [facility] and transferred to [facility] for neurosurgery evaluations. Initial labs with severe jaundice bilirubin 46, coagulopathy, lactic acidosis, MARYLIN creatinine 5.92 and , anion gap 35, bicarb 15 and elevated liver enzymes, ammonia 392. Patient was admitted to [facility], ICU with multisystem organ failure, subdural hematoma, severe alcoholic hepatitis with high meld score and poor prognosis, jaundice, encephalopathy. Patient was started on IV fluids, bicarb drip, banana bag, antibiotics, lactulose and labs were monitored closely.  Subdural hematoma was small and hence no surgical intervention was planned by neurosurgery followed with repeat CT and neuro checks. GI was consulted and patient was started on pentoxifylline. Breezy catheter was placed and patient was started on dialysis. Patient was intubated 4/18/2020 to maintain airway patency due to bright red output from NG. He was hypotensive and started on Levophed. Octreotide and Protonix drip were started. Repeat CT scan 4/19/2020 showed 3 mm midline shift. Vitamin K and antiepileptic started. Liver functions were improving but continued to receive dialysis and had metabolic acidosis. Patient developed new left lower lobe pneumonia. On 4/20/2020, patient's blood pressure was increasing with worsening neurological status and repeat CT showed increased intracranial hemorrhage with midline shift and possible herniation. EVD drain was placed due to high ICPs. Patient's neurological status deteriorated over the next day, poor prognosis explained to wife and patient's CODE STATUS was changed to DNR CC.   He was terminally extubated and death was pronounced 4/21/2020 at 5:30 PM    Procedures:  [employer], Intubation    Disposition: Death pronounced      Luna Patino MD         Department of Internal Medicine  [facility]         4/27/2020, 2:32 PM

## (undated) DEVICE — Z DISCONTINUED NO SUB IDED TAPE UMB W1/8XL36IN WHT COT STRND NONRADIOPAQUE

## (undated) DEVICE — TRACH TIE TRACH AD L W1XL23IN FIT 13-19IN FOAM 1 PC CLLR

## (undated) DEVICE — SUTURE VCRL SZ 2-0 L27IN ABSRB UD L26MM SH 1/2 CIR J417H

## (undated) DEVICE — SUTURE PROL SZ 2-0 L30IN NONABSORBABLE BLU L26MM SH 1/2 CIR 8833H

## (undated) DEVICE — SUTURE ETHLN SZ 3-0 L18IN NONABSORBABLE BLK L24MM PS-1 3/8 1663G

## (undated) DEVICE — MAGNETIC DRAPE: Brand: DEVON